# Patient Record
Sex: MALE | Race: WHITE | NOT HISPANIC OR LATINO | Employment: FULL TIME | URBAN - METROPOLITAN AREA
[De-identification: names, ages, dates, MRNs, and addresses within clinical notes are randomized per-mention and may not be internally consistent; named-entity substitution may affect disease eponyms.]

---

## 2017-10-04 ENCOUNTER — OFFICE VISIT - HEALTHEAST (OUTPATIENT)
Dept: FAMILY MEDICINE | Facility: CLINIC | Age: 42
End: 2017-10-04

## 2017-10-04 DIAGNOSIS — E78.5 HYPERLIPIDEMIA: ICD-10-CM

## 2017-10-04 DIAGNOSIS — S63.642A: ICD-10-CM

## 2017-10-04 LAB
ALT SERPL W P-5'-P-CCNC: 19 U/L (ref 0–45)
AST SERPL W P-5'-P-CCNC: 17 U/L (ref 0–40)
CHOLEST SERPL-MCNC: 182 MG/DL
FASTING STATUS PATIENT QL REPORTED: YES
HDLC SERPL-MCNC: 41 MG/DL
LDLC SERPL CALC-MCNC: 111 MG/DL
TRIGL SERPL-MCNC: 151 MG/DL

## 2017-10-04 ASSESSMENT — MIFFLIN-ST. JEOR: SCORE: 1773.14

## 2017-12-05 ENCOUNTER — COMMUNICATION - HEALTHEAST (OUTPATIENT)
Dept: FAMILY MEDICINE | Facility: CLINIC | Age: 42
End: 2017-12-05

## 2017-12-05 DIAGNOSIS — E78.5 HYPERLIPIDEMIA: ICD-10-CM

## 2018-05-23 ENCOUNTER — COMMUNICATION - HEALTHEAST (OUTPATIENT)
Dept: FAMILY MEDICINE | Facility: CLINIC | Age: 43
End: 2018-05-23

## 2018-05-23 DIAGNOSIS — E78.5 HYPERLIPIDEMIA: ICD-10-CM

## 2018-11-04 ENCOUNTER — COMMUNICATION - HEALTHEAST (OUTPATIENT)
Dept: FAMILY MEDICINE | Facility: CLINIC | Age: 43
End: 2018-11-04

## 2018-11-04 DIAGNOSIS — E78.5 HYPERLIPIDEMIA: ICD-10-CM

## 2019-01-22 ENCOUNTER — COMMUNICATION - HEALTHEAST (OUTPATIENT)
Dept: FAMILY MEDICINE | Facility: CLINIC | Age: 44
End: 2019-01-22

## 2019-01-22 DIAGNOSIS — E78.5 HYPERLIPIDEMIA: ICD-10-CM

## 2020-03-02 ENCOUNTER — COMMUNICATION - HEALTHEAST (OUTPATIENT)
Dept: FAMILY MEDICINE | Facility: CLINIC | Age: 45
End: 2020-03-02

## 2020-03-02 DIAGNOSIS — E78.5 HYPERLIPIDEMIA: ICD-10-CM

## 2020-05-26 ENCOUNTER — COMMUNICATION - HEALTHEAST (OUTPATIENT)
Dept: FAMILY MEDICINE | Facility: CLINIC | Age: 45
End: 2020-05-26

## 2020-05-26 DIAGNOSIS — E78.5 HYPERLIPIDEMIA: ICD-10-CM

## 2020-06-30 ENCOUNTER — OFFICE VISIT - HEALTHEAST (OUTPATIENT)
Dept: FAMILY MEDICINE | Facility: CLINIC | Age: 45
End: 2020-06-30

## 2020-06-30 DIAGNOSIS — E78.5 HYPERLIPIDEMIA: ICD-10-CM

## 2020-06-30 DIAGNOSIS — D68.59 PRIMARY HYPERCOAGULABLE STATE (H): ICD-10-CM

## 2020-06-30 DIAGNOSIS — E78.5 HYPERLIPIDEMIA, UNSPECIFIED HYPERLIPIDEMIA TYPE: ICD-10-CM

## 2020-06-30 ASSESSMENT — PATIENT HEALTH QUESTIONNAIRE - PHQ9: SUM OF ALL RESPONSES TO PHQ QUESTIONS 1-9: 0

## 2020-07-24 ENCOUNTER — RECORDS - HEALTHEAST (OUTPATIENT)
Dept: ADMINISTRATIVE | Facility: OTHER | Age: 45
End: 2020-07-24

## 2020-07-31 ENCOUNTER — RECORDS - HEALTHEAST (OUTPATIENT)
Dept: ADMINISTRATIVE | Facility: OTHER | Age: 45
End: 2020-07-31

## 2020-08-19 ENCOUNTER — RECORDS - HEALTHEAST (OUTPATIENT)
Dept: ADMINISTRATIVE | Facility: OTHER | Age: 45
End: 2020-08-19

## 2020-08-19 ENCOUNTER — COMMUNICATION - HEALTHEAST (OUTPATIENT)
Dept: FAMILY MEDICINE | Facility: CLINIC | Age: 45
End: 2020-08-19

## 2020-08-20 ENCOUNTER — OFFICE VISIT - HEALTHEAST (OUTPATIENT)
Dept: FAMILY MEDICINE | Facility: CLINIC | Age: 45
End: 2020-08-20

## 2020-08-20 ENCOUNTER — COMMUNICATION - HEALTHEAST (OUTPATIENT)
Dept: FAMILY MEDICINE | Facility: CLINIC | Age: 45
End: 2020-08-20

## 2020-08-20 DIAGNOSIS — Z01.818 PREOP GENERAL PHYSICAL EXAM: ICD-10-CM

## 2020-08-20 DIAGNOSIS — E78.5 HYPERLIPIDEMIA, UNSPECIFIED HYPERLIPIDEMIA TYPE: ICD-10-CM

## 2020-08-20 DIAGNOSIS — S83.512S RUPTURE OF ANTERIOR CRUCIATE LIGAMENT OF LEFT KNEE, SEQUELA: ICD-10-CM

## 2020-08-20 DIAGNOSIS — D68.59 PRIMARY HYPERCOAGULABLE STATE (H): ICD-10-CM

## 2020-09-01 ENCOUNTER — AMBULATORY - HEALTHEAST (OUTPATIENT)
Dept: LAB | Facility: CLINIC | Age: 45
End: 2020-09-01

## 2020-09-01 ENCOUNTER — AMBULATORY - HEALTHEAST (OUTPATIENT)
Dept: NURSING | Facility: CLINIC | Age: 45
End: 2020-09-01

## 2020-09-01 DIAGNOSIS — Z00.00 ROUTINE ADULT HEALTH MAINTENANCE: ICD-10-CM

## 2020-09-01 DIAGNOSIS — E78.5 HYPERLIPIDEMIA: ICD-10-CM

## 2020-09-01 DIAGNOSIS — Z01.818 PREOP GENERAL PHYSICAL EXAM: ICD-10-CM

## 2020-09-01 LAB
ALT SERPL W P-5'-P-CCNC: 25 U/L (ref 0–45)
AST SERPL W P-5'-P-CCNC: 20 U/L (ref 0–40)
CHOLEST SERPL-MCNC: 198 MG/DL
CK SERPL-CCNC: 237 U/L (ref 30–190)
ERYTHROCYTE [DISTWIDTH] IN BLOOD BY AUTOMATED COUNT: 11.8 % (ref 11–14.5)
FASTING STATUS PATIENT QL REPORTED: YES
HCT VFR BLD AUTO: 48.6 % (ref 40–54)
HDLC SERPL-MCNC: 41 MG/DL
HGB BLD-MCNC: 16.1 G/DL (ref 14–18)
INR PPP: 0.92 (ref 0.9–1.1)
LDLC SERPL CALC-MCNC: 127 MG/DL
MCH RBC QN AUTO: 28.9 PG (ref 27–34)
MCHC RBC AUTO-ENTMCNC: 33.1 G/DL (ref 32–36)
MCV RBC AUTO: 87 FL (ref 80–100)
PLATELET # BLD AUTO: 208 THOU/UL (ref 140–440)
PMV BLD AUTO: 9 FL (ref 7–10)
RBC # BLD AUTO: 5.56 MILL/UL (ref 4.4–6.2)
TRIGL SERPL-MCNC: 152 MG/DL
WBC: 4.6 THOU/UL (ref 4–11)

## 2020-09-08 ENCOUNTER — COMMUNICATION - HEALTHEAST (OUTPATIENT)
Dept: FAMILY MEDICINE | Facility: CLINIC | Age: 45
End: 2020-09-08

## 2020-09-11 ENCOUNTER — RECORDS - HEALTHEAST (OUTPATIENT)
Dept: ADMINISTRATIVE | Facility: OTHER | Age: 45
End: 2020-09-11

## 2020-09-25 ENCOUNTER — RECORDS - HEALTHEAST (OUTPATIENT)
Dept: ADMINISTRATIVE | Facility: OTHER | Age: 45
End: 2020-09-25

## 2020-10-23 ENCOUNTER — RECORDS - HEALTHEAST (OUTPATIENT)
Dept: ADMINISTRATIVE | Facility: OTHER | Age: 45
End: 2020-10-23

## 2020-11-23 ENCOUNTER — COMMUNICATION - HEALTHEAST (OUTPATIENT)
Dept: FAMILY MEDICINE | Facility: CLINIC | Age: 45
End: 2020-11-23

## 2020-11-23 DIAGNOSIS — E78.5 HYPERLIPIDEMIA, UNSPECIFIED HYPERLIPIDEMIA TYPE: ICD-10-CM

## 2020-12-04 ENCOUNTER — RECORDS - HEALTHEAST (OUTPATIENT)
Dept: ADMINISTRATIVE | Facility: OTHER | Age: 45
End: 2020-12-04

## 2021-02-09 ENCOUNTER — AMBULATORY - HEALTHEAST (OUTPATIENT)
Dept: LAB | Facility: CLINIC | Age: 46
End: 2021-02-09

## 2021-02-09 DIAGNOSIS — E78.5 HYPERLIPIDEMIA, UNSPECIFIED HYPERLIPIDEMIA TYPE: ICD-10-CM

## 2021-02-09 LAB
ALT SERPL W P-5'-P-CCNC: 14 U/L (ref 0–45)
AST SERPL W P-5'-P-CCNC: 17 U/L (ref 0–40)
CHOLEST SERPL-MCNC: 296 MG/DL
CK SERPL-CCNC: 226 U/L (ref 30–190)
FASTING STATUS PATIENT QL REPORTED: YES
HDLC SERPL-MCNC: 42 MG/DL
LDLC SERPL CALC-MCNC: 220 MG/DL
TRIGL SERPL-MCNC: 171 MG/DL

## 2021-02-10 ENCOUNTER — COMMUNICATION - HEALTHEAST (OUTPATIENT)
Dept: FAMILY MEDICINE | Facility: CLINIC | Age: 46
End: 2021-02-10

## 2021-02-10 ENCOUNTER — AMBULATORY - HEALTHEAST (OUTPATIENT)
Dept: FAMILY MEDICINE | Facility: CLINIC | Age: 46
End: 2021-02-10

## 2021-02-10 DIAGNOSIS — E78.5 HYPERLIPIDEMIA, UNSPECIFIED HYPERLIPIDEMIA TYPE: ICD-10-CM

## 2021-02-10 RX ORDER — ROSUVASTATIN CALCIUM 10 MG/1
10 TABLET, COATED ORAL AT BEDTIME
Status: SHIPPED
Start: 2021-02-10 | End: 2021-10-14

## 2021-03-15 ENCOUNTER — RECORDS - HEALTHEAST (OUTPATIENT)
Dept: ADMINISTRATIVE | Facility: OTHER | Age: 46
End: 2021-03-15

## 2021-05-02 ENCOUNTER — RECORDS - HEALTHEAST (OUTPATIENT)
Dept: ADMINISTRATIVE | Facility: OTHER | Age: 46
End: 2021-05-02

## 2021-05-27 ASSESSMENT — PATIENT HEALTH QUESTIONNAIRE - PHQ9: SUM OF ALL RESPONSES TO PHQ QUESTIONS 1-9: 0

## 2021-05-31 VITALS — WEIGHT: 194 LBS | HEIGHT: 71 IN | BODY MASS INDEX: 27.16 KG/M2

## 2021-06-01 ENCOUNTER — RECORDS - HEALTHEAST (OUTPATIENT)
Dept: ADMINISTRATIVE | Facility: CLINIC | Age: 46
End: 2021-06-01

## 2021-06-04 VITALS
TEMPERATURE: 98.8 F | WEIGHT: 194 LBS | DIASTOLIC BLOOD PRESSURE: 72 MMHG | BODY MASS INDEX: 27.25 KG/M2 | RESPIRATION RATE: 12 BRPM | SYSTOLIC BLOOD PRESSURE: 104 MMHG | HEART RATE: 78 BPM

## 2021-06-06 NOTE — TELEPHONE ENCOUNTER
RN cannot approve Refill Request    RN can NOT refill this medication Protocol failed and NO refill given.      Delma Hahn, [facility] Triage/Med Refill 3/3/2020    Requested Prescriptions   Pending Prescriptions Disp Refills     rosuvastatin (CRESTOR) 10 MG tablet [Pharmacy Med Name: ROSUVASTATIN  10MG  TAB] 90 tablet 3     Sig: TAKE 1 TABLET BY MOUTH  DAILY       Statins Refill Protocol (Hmg CoA Reductase Inhibitors) Failed - 3/2/2020  8:10 PM        Failed - PCP or prescribing provider visit in past 12 months      Last office visit with prescriber/PCP: Visit date not found OR same dept: Visit date not found OR same specialty: 10/4/2017 Kelvin Ray MD  Last physical: Visit date not found Last MTM visit: Visit date not found   Next visit within 3 mo: Visit date not found  Next physical within 3 mo: Visit date not found  Prescriber OR PCP: Luisana Brown MD  Last diagnosis associated with med order: 1. Hyperlipidemia  - rosuvastatin (CRESTOR) 10 MG tablet [Pharmacy Med Name: ROSUVASTATIN  10MG  TAB]; TAKE 1 TABLET BY MOUTH  DAILY  Dispense: 90 tablet; Refill: 3    If protocol passes may refill for 12 months if within 3 months of last provider visit (or a total of 15 months).

## 2021-06-06 NOTE — TELEPHONE ENCOUNTER
Please contact this patient let him know that the medication was refilled for 90 days but needs to be seen for an office visit with Dr. Ray

## 2021-06-07 ENCOUNTER — AMBULATORY - HEALTHEAST (OUTPATIENT)
Dept: LAB | Facility: CLINIC | Age: 46
End: 2021-06-07

## 2021-06-07 DIAGNOSIS — E78.5 HYPERLIPIDEMIA, UNSPECIFIED HYPERLIPIDEMIA TYPE: ICD-10-CM

## 2021-06-07 LAB
ALT SERPL W P-5'-P-CCNC: 20 U/L (ref 0–45)
AST SERPL W P-5'-P-CCNC: 21 U/L (ref 0–40)
CHOLEST SERPL-MCNC: 209 MG/DL
CK SERPL-CCNC: 348 U/L (ref 30–190)
FASTING STATUS PATIENT QL REPORTED: YES
HDLC SERPL-MCNC: 40 MG/DL
LDLC SERPL CALC-MCNC: 133 MG/DL
TRIGL SERPL-MCNC: 182 MG/DL

## 2021-06-09 NOTE — PROGRESS NOTES
"Eliud Groves is a 45 y.o. male who is being evaluated via a billable video visit.      The patient has been notified of following:     \"This video visit will be conducted via a call between you and your physician/provider. We have found that certain health care needs can be provided without the need for an in-person physical exam.  This service lets us provide the care you need with a video conversation.  If a prescription is necessary we can send it directly to your pharmacy.  If lab work is needed we can place an order for that and you can then stop by our lab to have the test done at a later time.    Video visits are billed at different rates depending on your insurance coverage. Please reach out to your insurance provider with any questions.    If during the course of the call the physician/provider feels a video visit is not appropriate, you will not be charged for this service.\"    Patient has given verbal consent to a Video visit? Yes  How would you like to obtain your AVS? AVS Preference: Mail a copy.  Patient would like the video invitation sent by: Text to cell phone: [phone]  Will anyone else be joining your video visit? No        Video Start Time: 8:47 AM    Additional provider notes:     Subjective: This patient had a virtual visit, video, because of the coronavirus pandemic.    This patient needed follow-up on his hyper lipid treatment.  He is on rosuvastatin.    He occasionally gets some achiness in through the muscles.  I will recheck CPK AST ALT and lipid.  He has had some elevated CPK in the past without her medications.    He has been pretty stable with this medicine    Back in October 2017  normal AST ALT and CPK    He works from home now because of the COVID 19.  Is a  does design work.    No COVID-19 symptoms or exposures that he knows of.    He did get a crack on his toe I think was the fourth toe on the right foot at the base of the toe on the plantar aspect, " this opened up he has a crack there is not infected does not look like there is any drainage he will use Neosporin for them.    He will come in for labs CPK AST ALT and lipid as a lab only check blood pressure as well    Patient's has had no leg swelling.  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 Caro from hematology in 2012.  No long-term anticoagulation indicated, consider subcu Lovenox for some prolonged incapacity etc.    He has had no problems.    Stays active [employer] running.    Tobacco status: He  reports that he has never smoked. He does not have any smokeless tobacco history on file.    Patient Active Problem List    Diagnosis Date Noted     Factor V Leiden Mutation      Influenza      Cough      Hyperlipidemia        Current Outpatient Medications   Medication Sig Dispense Refill     rosuvastatin (CRESTOR) 10 MG tablet Take 1 tablet (10 mg total) by mouth daily. 90 tablet 3     No current facility-administered medications for this visit.        ROS:   10 point review of systems positive as outlined above otherwise negative    Objective:    There were no vitals taken for this visit.  There is no height or weight on file to calculate BMI.      General appearance: No acute distress    HEENT denies any neck stiffness or swollen glands denies any sore throat    Lungs: Nonlabored breathing no wheezing    Heart: No racing or palpitations    Joints without redness or swelling    Extremities without edema.  No redness or warmth to the calf    Patient has a crack at the base of the right fourth toe on the plantar aspect, no secondary infection    No abdominal pain or tenderness.    No weakness or numbness.    Results for orders placed or performed in visit on 10/04/17   Lipid [place] FASTING   Result Value Ref Range    Cholesterol 182 <=199 mg/dL    Triglycerides 151 (H) <=149 mg/dL    HDL Cholesterol 41 >=40 mg/dL    LDL Calculated 111 <=129 mg/dL    Patient Fasting >  8hrs? Yes    AST (SGOT)   Result Value Ref Range    AST 17 0 - 40 U/L   ALT (SGPT)   Result Value Ref Range    ALT 19 0 - 45 U/L   CK Total   Result Value Ref Range    CK, Total 165 30 - 190 U/L       Assessment:  1. Hyperlipidemia, unspecified hyperlipidemia type     2. Hyperlipidemia  rosuvastatin (CRESTOR) 10 MG tablet    Lipid [place] FASTING    AST (SGOT)    ALT (SGPT)    CK Total   3. Factor V Leiden Mutation     4.  Fissure at the base of the fourth toe, use Neosporin      Hyperlipidemia on rosuvastatin 10 mg, lab only CPK AST ALT and lipid.    Plan physical in 6 to 12 months at the office.    Please see above discussion regarding factor V Leyden mutation.    Plan: As outlined above    This transcription uses voice recognition software, which may contain typographical errors.  There is a Dr. Ray has gone pain not too bad limb get      Video-Visit Details    Type of service:  Video Visit    Video End Time (time video stopped): 9:04 AM  Originating Location (pt. Location): Home    Distant Location (provider location):  [facility]     Platform used for Video Visit: Prince Ray MD

## 2021-06-10 NOTE — TELEPHONE ENCOUNTER
New Appointment Needed  What is the reason for the visit:    Pre-Op Appt Request  When is the surgery? :  09.11.20  Where is the surgery?:   [facility] in [place]   Who is the surgeon? :  Dr Díaz   What type of surgery is being done?: ACL replacement   Provider Preference: PCP or willing to see anybody   How soon do you need to be seen?: Friday 08.21.20 if able to since he has a lab apt scheduled that day   Waitlist offered?: No  Okay to leave a detailed message:  Yes

## 2021-06-10 NOTE — PROGRESS NOTES
"[address] [address]  Dept: [phone]  Dept Fax: [phone]  Primary Provider: Kelvin Ray MD      Eliud Groves is a 45 y.o. male who is being evaluated via a billable video visit.      The patient has been notified of following:     \"This video visit will be conducted via a call between you and your physician/provider. We have found that certain health care needs can be provided without the need for an in-person physical exam.  This service lets us provide the care you need with a video conversation.  If a prescription is necessary we can send it directly to your pharmacy.  If lab work is needed we can place an order for that and you can then stop by our lab to have the test done at a later time.    Video visits are billed at different rates depending on your insurance coverage. Please reach out to your insurance provider with any questions.    If during the course of the call the physician/provider feels a video visit is not appropriate, you will not be charged for this service.\"    Patient has given verbal consent to a Video visit? Yes    Patient would like to receive their AVS by AVS Preference: Mail a copy.    Patient would like the video invitation sent by: Text to cell phone: [phone]    Will anyone else be joining your video visit? No        Video Start Time: 11:25 am    PREOPERATIVE EVALUATION:  Today's date: 8/20/2020    Eliud Groves is a 45 y.o. male who presents for a preoperative evaluation.    Proposed Surgery/ Procedure: ACL replacement left knee  Date of Surgery/ Procedure: 9/11/2020  Time of Surgery/ Procedure: 12:00PM  Hospital/Surgical Facility: [facility] Fax Number: [phone]  Primary Physician: Kelvin Ray MD  Type of Anesthesia Anticipated: General    HPI related to upcoming procedure: Patient had a waterskiing accident this summer where he was getting up to slalom and felt like something hit him in " the back of the leg.    He was evaluated in felt that he had a hamstring strain.    They went on for an MRI and it showed a ACL complete tear.    Patient had previous ACL repair 15 to 18 years ago with medial meniscal repair as well.  This was following a snowboard injury    He had done well up until this past summer    He was evaluated weighted by the orthopedist and the MRI was reviewed showing the hamstring strain and a recurrent ACL rupture.    Plan is for the ACL repair    This patient has family history for factor V Leyden mutation, his sister has had a DVT and is homozygous for this.    The patient got tested and is heterozygous for this.    He saw hematology, Dr. Caro, back in 2014.  Recommendation was that if he is immobilized or has some type of surgery or casting he should have some DVT prophylaxis.    I reviewed options for the patient and decided to take Xarelto 10 mg 1 a day postop for 30 days.    Prescription was sent to the pharmacy and he will bring that to his surgery and discussed that with the orthopedic surgeon as to when he will start.    Patient will get additional labs of CBC INR lipid AST and ALT and get vital signs checked on 8/25/2020 at the [facility].    There is no family history for anesthesia problems or bleeding problems    Have you ever had a heart attack or stroke? No  Have you ever had surgery on your heart or blood vessels, such as a stent, coronary (heart) bypass, or surgery on an artery in the head, neck, heart, or legs? No  Do you have chest pain when you are physically active? No  Do you have a history of heart failure? No  Do you currently have a cold, bronchitis, or symptoms of other respiratory (head and chest) infections? No  Do you have a cough, shortness of breath, or wheezing? No  Do you or anyone in your family have a history of blood clots? Yes; sister have a blood clot at one point. Patient has heterozygous Factor V Leiden mutation  Do you or anyone in  your family have a serious bleeding problem, such as long-lasting bleeding after surgeries or cuts? No  Have you ever had anemia or been told to take iron pills? No  Have you had any abnormal blood loss such as black, tarry or bloody stools, or abnormal vaginal bleeding?No  Have you ever had a blood transfusion? No   Are you willing to have a blood transfusion if it is medically needed before, during, or after your surgery? Yes  Have you or anyone in your family ever had problems with anesthesia (sedation for surgery)? No  Do you have sleep apnea, excessive snoring, or daytime drowsiness? No  Do you have any artifical heart valves or other implanted medical devices, such as a pacemaker, defibrillator, or continuous glucose monitor? No  Do you have any artifical joints? No  Are you allergic to latex? No  Is there any chance that you may be pregnant? No    Patient has a Health Care Directive or Living Will:  Yes      Review of Systems  10 pt ROS positive as above   Otherwise neg  Patient denies any COVID-19 symptoms or exposure.  Denies any cough congestion    Has not had general anesthesia before.    He has no history of any asthma or breathing problems    No chest pain.    Patient is quite active.  He is a runner.      1. Preop general physical exam  HM2(CBC w/o Differential)    INR   2. Hyperlipidemia, unspecified hyperlipidemia type     3. Rupture of anterior cruciate ligament of left knee, sequela  rivaroxaban ANTICOAGULANT (XARELTO) 10 mg tablet   4. Factor V Leiden Mutation  rivaroxaban ANTICOAGULANT (XARELTO) 10 mg tablet    heterozygous   5. DVT prophylaxis  rivaroxaban ANTICOAGULANT (XARELTO) 10 mg tablet       Patient Active Problem List    Diagnosis Date Noted     Factor V Leiden Mutation      Influenza      Cough      Hyperlipidemia      No past medical history on file.  No past surgical history on file.  Current Outpatient Medications   Medication Sig Dispense Refill     rosuvastatin (CRESTOR) 10 MG  tablet Take 1 tablet (10 mg total) by mouth daily. 90 tablet 3     rivaroxaban ANTICOAGULANT (XARELTO) 10 mg tablet Take 1 tablet (10 mg total) by mouth daily. 30 tablet 0     No current facility-administered medications for this visit.        Allergies   Allergen Reactions     Atorvastatin Myalgia     Simvastatin      Elevated CPK       Social History     Tobacco Use     Smoking status: Never Smoker   Substance Use Topics     Alcohol use: Not on file      FH:    Heart disease: dad age 40's CAD and hyperlipidemia    Sister with homozygous factor V Leiden mutation      Social History     Substance and Sexual Activity   Drug Use Not on file           Objective      Vitals:  No vitals were obtained today due to virtual visit.    He will come in on 8/25/2020 and get vital signs    Physical Exam      General appearance no acute distress    HEENT: No nasal congestion no scleral icterus oropharynx denies any sore throat no adenopathy    Neck is supple    Lungs: Nonlabored breathing no wheezing.    Heart: No palpitations or rapid heartbeat.    Abdomen denies abdominal pain, no fullness or mass.    Denies any adenopathy.    Skin was normal no rashes    Extremities.  Presently no swelling in through the left knee.    Denies any joint redness.    Results of MRI reviewed with the patient      Please refer to the physical examination documented in the anesthesia record on the day of surgery.    Patient will be coming in for lab only on 8/25/2020 with lipid AST ALT CBC and INR    PRE-OP Diagnostics:   No chest x-ray or EKG indicated    [id number]}     Assessment & Plan     Patient is okay for surgery, will use Xarelto for 1 month postop      The proposed surgical procedure is considered   INTERMEDIATE    REVISED CARDIAC RISK INDEX   The patient has the following serious cardiovascular risks for perioperative complications:  No serious cardiac risks = 0 points    INTERPRETATION: 0 points: Class I (very low risk - 0.4% complication  rate)    The patient has the following additional risks and recommendations for perioperative complications:      ANEMIA/BLEEDING/CLOTTING:    - History of factor V Leiden mutation, heterozygous  Patient will have DVT prevention postoperatively with Xarelto 10 mg daily     MEDICATION INSTRUCTIONS:  Patient has hyperlipidemia and will continue rosuvastatin pre-and postop    RECOMMENDATION:  APPROVAL GIVEN to proceed with proposed procedure, without further diagnostic evaluation.      Video-Visit Details    Type of service:  Video Visit    Video End Time (time video stopped): 11:52 AM  Originating Location (pt. Location): Home    Distant Location (provider location):  [facility]     Platform used for Video Visit: [employer]    No follow-ups on file.    Signed Electronically by: Kelvin Ray MD    Copy of this evaluation report is provided to requesting physician.    Preop [facility] Preop Guidelines    Revised Cardiac Risk Index

## 2021-06-10 NOTE — TELEPHONE ENCOUNTER
I would recommend he try to do a virtual video visit for preop with me tomorrow.    He will need additional lab tests in addition to just the cholesterol level.    We would then also decide if he needs anything else like a chest x-ray or EKG.    He could then get these done on Friday, in addition to his cholesterol blood test

## 2021-06-11 NOTE — TELEPHONE ENCOUNTER
Labs faxed to the number below    ----- Message from Kelvin Ray MD sent at 9/4/2020 11:10 AM CDT -----  Please fax these labs to [facility] .    I did contact the patient regarding the results and he will follow-up in 3 months, monitor for any myalgias symptoms he will stay on the rosuvastatin low-dose

## 2021-06-13 NOTE — TELEPHONE ENCOUNTER
I put in future orders for liver function and CPK and lipid    Please get this checked in the next month or so.    Continue to stay off the rosuvastatin

## 2021-06-15 NOTE — TELEPHONE ENCOUNTER
----- Message from Kelvin Ray MD sent at 2/10/2021 11:52 AM CST -----  Please contact this patient and schedule a follow-up lab appointment in about 3 months.  I put in orders  I did discuss with him his lipid panel and a CPK level    He is going to restart the Crestor at 10 mg daily.  His CPK level was essentially the same whether he was on it or not.  He is more active working out since he has had his knee surgery.    He will call me if he develops any myalgias symptoms when he is back on the Crestor

## 2021-06-15 NOTE — TELEPHONE ENCOUNTER
Called and spoke with Eliud Groves , Message was given, Eliud Groves  understood, no further questions.  Appointment was offer, Patient declined. Patient will call back.

## 2021-06-23 NOTE — TELEPHONE ENCOUNTER
RN cannot approve Refill Request    RN can NOT refill this medication PCP messaged that patient is overdue for Office Visit.     Delma Hahn, [facility] Triage/Med Refill 1/24/2019    Requested Prescriptions   Pending Prescriptions Disp Refills     rosuvastatin (CRESTOR) 10 MG tablet [Pharmacy Med Name: ROSUVASTATIN  10MG  TAB] 90 tablet 0     Sig: TAKE 1 TABLET BY MOUTH  DAILY    Statins Refill Protocol (Hmg CoA Reductase Inhibitors) Failed - 1/22/2019  8:06 PM       Failed - PCP or prescribing provider visit in past 12 months     Last office visit with prescriber/PCP: 10/4/2017 Kelvin Ray MD OR same dept: Visit date not found OR same specialty: 10/4/2017 Kelvin Ray MD  Last physical: Visit date not found Last MTM visit: Visit date not found   Next visit within 3 mo: Visit date not found  Next physical within 3 mo: Visit date not found  Prescriber OR PCP: eKlvin Ray MD  Last diagnosis associated with med order: 1. Hyperlipidemia  - rosuvastatin (CRESTOR) 10 MG tablet [Pharmacy Med Name: ROSUVASTATIN  10MG  TAB]; TAKE 1 TABLET BY MOUTH  DAILY  Dispense: 90 tablet; Refill: 0    If protocol passes may refill for 12 months if within 3 months of last provider visit (or a total of 15 months).

## 2021-07-23 ENCOUNTER — OFFICE VISIT (OUTPATIENT)
Dept: FAMILY MEDICINE | Facility: CLINIC | Age: 46
End: 2021-07-23
Payer: COMMERCIAL

## 2021-07-23 VITALS
HEART RATE: 65 BPM | TEMPERATURE: 97.7 F | HEIGHT: 71 IN | SYSTOLIC BLOOD PRESSURE: 134 MMHG | DIASTOLIC BLOOD PRESSURE: 86 MMHG | WEIGHT: 193.25 LBS | BODY MASS INDEX: 27.06 KG/M2

## 2021-07-23 DIAGNOSIS — L98.8 MACERATION OF SKIN: Primary | ICD-10-CM

## 2021-07-23 PROCEDURE — 99213 OFFICE O/P EST LOW 20 MIN: CPT | Performed by: PHYSICIAN ASSISTANT

## 2021-07-23 ASSESSMENT — MIFFLIN-ST. JEOR: SCORE: 1768.45

## 2021-07-23 NOTE — PROGRESS NOTES
"  Subjective:      Eliud Groves is a 46 year old male with chief complaint of lesion between his toes.  He has a spot between the right 4th and 5th toes that has been bothering him for about a week and a half.  He thinks that the start the pinky toe did swell up a little bit, that has improved. He had a small white spot in between the toes and it seems to be getting larger. Not really painful. He does not recall an insect bite. He thought perhaps he could have had a blister in that area.    Patient Active Problem List   Diagnosis     Factor V Leiden Mutation     Hyperlipidemia       Current Outpatient Medications:      rosuvastatin (CRESTOR) 10 MG tablet, [ROSUVASTATIN (CRESTOR) 10 MG TABLET] Take 1 tablet (10 mg total) by mouth at bedtime. (Patient taking differently: Take 10 mg by mouth At Bedtime Taking a half tab), Disp: , Rfl:      Objective:     Allergies:  Atorvastatin and Simvastatin    Vitals:  Vitals:    07/23/21 0743 07/23/21 0832   BP: (!) 147/87 134/86   Pulse: 65 65   Temp: 97.7  F (36.5  C)    Weight: 87.7 kg (193 lb 4 oz)    Height: 1.795 m (5' 10.67\")        Vital signs reviewed.  General: Patient is alert and oriented x 3, in no apparent distress  Skin: Moderate maceration of skin in between the 4th and 5th toes on the right foot, entire area is about 8 to 9 mm, no surrounding erythema, no purulent fluid or serous fluid leaking    I did use a scissors and debride the wound, cutting off the external macerated tissue        Assessment and Plan:   1. Maceration of skin  Unclear cause of this. Could be a blister, insect bite, abrasion.  No signs of infection today. I discussed with him that he needs to keep his foot dry, and specifically the area between his toes dry. Discussed ways to do that including using a cotton ball or piece of gauze in between his toes.   I anticipate skin should dry and heal within the next 4 to 5 days.  He is going up to his cabin this weekend and is wondering if he can " swim. I discussed with him that if he is swimming in a lake, he would have a risk of exposing the area to bacteria, and certainly would delay healing time.  We discussed reasons to follow-up with us including no significant improvement by next week, or redness in the surrounding area or toes.      This dictation uses voice recognition software, which may contain typographical errors.

## 2021-08-21 ENCOUNTER — HEALTH MAINTENANCE LETTER (OUTPATIENT)
Age: 46
End: 2021-08-21

## 2021-10-11 ENCOUNTER — HEALTH MAINTENANCE LETTER (OUTPATIENT)
Age: 46
End: 2021-10-11

## 2021-10-13 ENCOUNTER — LAB (OUTPATIENT)
Dept: LAB | Facility: CLINIC | Age: 46
End: 2021-10-13
Payer: COMMERCIAL

## 2021-10-13 DIAGNOSIS — E78.5 HYPERLIPIDEMIA, UNSPECIFIED HYPERLIPIDEMIA TYPE: Primary | ICD-10-CM

## 2021-10-13 DIAGNOSIS — E78.5 HYPERLIPIDEMIA, UNSPECIFIED HYPERLIPIDEMIA TYPE: ICD-10-CM

## 2021-10-13 LAB
ALBUMIN SERPL-MCNC: 4 G/DL (ref 3.5–5)
ALP SERPL-CCNC: 73 U/L (ref 45–120)
ALT SERPL W P-5'-P-CCNC: 22 U/L (ref 0–45)
ANION GAP SERPL CALCULATED.3IONS-SCNC: 11 MMOL/L (ref 5–18)
AST SERPL W P-5'-P-CCNC: 21 U/L (ref 0–40)
BILIRUB SERPL-MCNC: 0.5 MG/DL (ref 0–1)
BUN SERPL-MCNC: 15 MG/DL (ref 8–22)
CALCIUM SERPL-MCNC: 9.7 MG/DL (ref 8.5–10.5)
CHLORIDE BLD-SCNC: 106 MMOL/L (ref 98–107)
CHOLEST SERPL-MCNC: 189 MG/DL
CK SERPL-CCNC: 299 U/L (ref 30–190)
CO2 SERPL-SCNC: 24 MMOL/L (ref 22–31)
CREAT SERPL-MCNC: 1.2 MG/DL (ref 0.7–1.3)
FASTING STATUS PATIENT QL REPORTED: YES
GFR SERPL CREATININE-BSD FRML MDRD: 72 ML/MIN/1.73M2
GLUCOSE BLD-MCNC: 113 MG/DL (ref 70–125)
HDLC SERPL-MCNC: 45 MG/DL
LDLC SERPL CALC-MCNC: 120 MG/DL
POTASSIUM BLD-SCNC: 4.4 MMOL/L (ref 3.5–5)
PROT SERPL-MCNC: 6.6 G/DL (ref 6–8)
SODIUM SERPL-SCNC: 141 MMOL/L (ref 136–145)
TRIGL SERPL-MCNC: 119 MG/DL

## 2021-10-13 PROCEDURE — 80061 LIPID PANEL: CPT

## 2021-10-13 PROCEDURE — 80053 COMPREHEN METABOLIC PANEL: CPT | Performed by: PHYSICIAN ASSISTANT

## 2021-10-13 PROCEDURE — 82550 ASSAY OF CK (CPK): CPT

## 2021-10-13 PROCEDURE — 36415 COLL VENOUS BLD VENIPUNCTURE: CPT

## 2021-12-07 DIAGNOSIS — E78.5 HYPERLIPIDEMIA, UNSPECIFIED HYPERLIPIDEMIA TYPE: ICD-10-CM

## 2021-12-07 RX ORDER — ROSUVASTATIN CALCIUM 5 MG/1
5 TABLET, COATED ORAL AT BEDTIME
Qty: 90 TABLET | Refills: 0 | Status: SHIPPED | OUTPATIENT
Start: 2021-12-07 | End: 2022-02-14

## 2021-12-07 NOTE — TELEPHONE ENCOUNTER
Reason for Call:  Medication or medication refill:    Do you use a [facility]?  Name of the pharmacy and phone number for the current request:  [facility] [address]    Name of the medication requested: rosuvastatin    Other request:     Can we leave a detailed message on this number? Not Applicable    Phone number patient can be reached at: Home number on file [phone] (home)    Best Time: asap    Call taken on 12/7/2021 at 10:51 AM by Mesha Laura

## 2022-02-14 ENCOUNTER — MYC REFILL (OUTPATIENT)
Dept: FAMILY MEDICINE | Facility: CLINIC | Age: 47
End: 2022-02-14
Payer: COMMERCIAL

## 2022-02-14 DIAGNOSIS — E78.5 HYPERLIPIDEMIA, UNSPECIFIED HYPERLIPIDEMIA TYPE: ICD-10-CM

## 2022-02-16 RX ORDER — ROSUVASTATIN CALCIUM 5 MG/1
5 TABLET, COATED ORAL AT BEDTIME
Qty: 90 TABLET | Refills: 0 | Status: SHIPPED | OUTPATIENT
Start: 2022-02-16 | End: 2022-05-16

## 2022-05-15 DIAGNOSIS — E78.5 HYPERLIPIDEMIA, UNSPECIFIED HYPERLIPIDEMIA TYPE: ICD-10-CM

## 2022-05-16 RX ORDER — ROSUVASTATIN CALCIUM 5 MG/1
TABLET, COATED ORAL
Qty: 90 TABLET | Refills: 0 | Status: SHIPPED | OUTPATIENT
Start: 2022-05-16 | End: 2022-08-15

## 2022-05-16 NOTE — TELEPHONE ENCOUNTER
"Routing refill request to provider for review/approval because:  Labs out of range:  CK    Last Written Prescription Date:  2/16/22  Last Fill Quantity: 90,  # refills: 0   Last office visit provider:  7/23/21     Requested Prescriptions   Pending Prescriptions Disp Refills     rosuvastatin (CRESTOR) 5 MG tablet [Pharmacy Med Name: ROSUVASTATIN CALCIUM 5 MG TAB] 90 tablet 0     Sig: TAKE 1 TABLET BY MOUTH AT BEDTIME       Statins Protocol Failed - 5/16/2022  2:07 PM        Failed - No abnormal creatine kinase in past 12 months     Recent Labs   Lab Test 10/13/21  0817   *                Passed - LDL on file in past 12 months     Recent Labs   Lab Test 10/13/21  0817                Passed - Recent (12 mo) or future (30 days) visit within the authorizing provider's specialty     Patient has had an office visit with the authorizing provider or a provider within the authorizing providers department within the previous 12 mos or has a future within next 30 days. See \"Patient Info\" tab in inbasket, or \"Choose Columns\" in Meds & Orders section of the refill encounter.              Passed - Medication is active on med list        Passed - Patient is age 18 or older             Andrea Fleming RN 05/16/22 2:07 PM  "

## 2022-09-08 DIAGNOSIS — E78.5 HYPERLIPIDEMIA, UNSPECIFIED HYPERLIPIDEMIA TYPE: ICD-10-CM

## 2022-09-09 RX ORDER — ROSUVASTATIN CALCIUM 5 MG/1
TABLET, COATED ORAL
Qty: 30 TABLET | Refills: 0 | Status: SHIPPED | OUTPATIENT
Start: 2022-09-09 | End: 2022-09-14

## 2022-09-09 NOTE — TELEPHONE ENCOUNTER
"Routing refill request to provider for review/approval because:  Labs not current:  Creatinine kinase  Patient needs to be seen because it has been more than 1 year since last office visit.    Former patient of Kelvin Ray & has not established care with another provider.  Please assign refill request to covering provider per Clinic standard process.    Last Written Prescription Date:  8/15/22  Last Fill Quantity: 30,  # refills: 0   Last office visit provider:  7/23/21     Requested Prescriptions   Pending Prescriptions Disp Refills     rosuvastatin (CRESTOR) 5 MG tablet [Pharmacy Med Name: ROSUVASTATIN CALCIUM 5 MG TAB] 30 tablet 0     Sig: TAKE 1 TABLET BY MOUTH EVERYDAY AT BEDTIME       Statins Protocol Failed - 9/8/2022 12:34 PM        Failed - No abnormal creatine kinase in past 12 months     Recent Labs   Lab Test 10/13/21  0817   *                Failed - Recent (12 mo) or future (30 days) visit within the authorizing provider's specialty     Patient has had an office visit with the authorizing provider or a provider within the authorizing providers department within the previous 12 mos or has a future within next 30 days. See \"Patient Info\" tab in inbasket, or \"Choose Columns\" in Meds & Orders section of the refill encounter.              Passed - LDL on file in past 12 months     Recent Labs   Lab Test 10/13/21  0817                Passed - Medication is active on med list        Passed - Patient is age 18 or older             Catherine Leon RN 09/09/22 1:54 PM  "

## 2022-09-13 DIAGNOSIS — E78.5 HYPERLIPIDEMIA, UNSPECIFIED HYPERLIPIDEMIA TYPE: ICD-10-CM

## 2022-09-13 NOTE — TELEPHONE ENCOUNTER
"Routing refill request to provider for review/approval because:  Labs out of range:  CKT done 10/13/21  Patient needs to be seen because it has been more than 1 year since last office visit.   Pt requesting 90 days supply. Pt has umcoming appt sched 10/13/22.    Last Written Prescription Date:  9/9/22  Last Fill Quantity: 30,  # refills: 0   Last office visit provider:  7/23/21     Requested Prescriptions   Pending Prescriptions Disp Refills     rosuvastatin (CRESTOR) 5 MG tablet [Pharmacy Med Name: ROSUVASTATIN CALCIUM 5 MG TAB] 90 tablet 1     Sig: TAKE 1 TABLET BY MOUTH EVERYDAY AT BEDTIME       Statins Protocol Failed - 9/13/2022 11:00 AM        Failed - No abnormal creatine kinase in past 12 months     Recent Labs   Lab Test 10/13/21  0817   *                Failed - Recent (12 mo) or future (30 days) visit within the authorizing provider's specialty     Patient has had an office visit with the authorizing provider or a provider within the authorizing providers department within the previous 12 mos or has a future within next 30 days. See \"Patient Info\" tab in inbasket, or \"Choose Columns\" in Meds & Orders section of the refill encounter.              Passed - LDL on file in past 12 months     Recent Labs   Lab Test 10/13/21  0817                Passed - Medication is active on med list        Passed - Patient is age 18 or older             DEDRICK GRAJEDA RN 09/13/22 11:22 AM  "

## 2022-09-14 RX ORDER — ROSUVASTATIN CALCIUM 5 MG/1
TABLET, COATED ORAL
Qty: 90 TABLET | Refills: 1 | Status: SHIPPED | OUTPATIENT
Start: 2022-09-14 | End: 2023-02-03

## 2022-09-25 ENCOUNTER — HEALTH MAINTENANCE LETTER (OUTPATIENT)
Age: 47
End: 2022-09-25

## 2022-10-13 ENCOUNTER — OFFICE VISIT (OUTPATIENT)
Dept: FAMILY MEDICINE | Facility: CLINIC | Age: 47
End: 2022-10-13
Payer: COMMERCIAL

## 2022-10-13 VITALS
OXYGEN SATURATION: 99 % | DIASTOLIC BLOOD PRESSURE: 88 MMHG | HEART RATE: 68 BPM | WEIGHT: 115 LBS | TEMPERATURE: 97.7 F | RESPIRATION RATE: 20 BRPM | BODY MASS INDEX: 16.1 KG/M2 | SYSTOLIC BLOOD PRESSURE: 128 MMHG | HEIGHT: 71 IN

## 2022-10-13 DIAGNOSIS — Z23 NEEDS FLU SHOT: ICD-10-CM

## 2022-10-13 DIAGNOSIS — E78.5 HYPERLIPIDEMIA, UNSPECIFIED HYPERLIPIDEMIA TYPE: Primary | ICD-10-CM

## 2022-10-13 DIAGNOSIS — Z23 NEED FOR COVID-19 VACCINE: ICD-10-CM

## 2022-10-13 DIAGNOSIS — Z12.11 SCREEN FOR COLON CANCER: ICD-10-CM

## 2022-10-13 DIAGNOSIS — R74.8 ELEVATED CK: ICD-10-CM

## 2022-10-13 LAB
ANION GAP SERPL CALCULATED.3IONS-SCNC: 13 MMOL/L (ref 7–15)
BUN SERPL-MCNC: 14.6 MG/DL (ref 6–20)
CALCIUM SERPL-MCNC: 9.7 MG/DL (ref 8.6–10)
CHLORIDE SERPL-SCNC: 101 MMOL/L (ref 98–107)
CHOLEST SERPL-MCNC: 230 MG/DL
CREAT SERPL-MCNC: 1.05 MG/DL (ref 0.67–1.17)
DEPRECATED HCO3 PLAS-SCNC: 25 MMOL/L (ref 22–29)
GFR SERPL CREATININE-BSD FRML MDRD: 88 ML/MIN/1.73M2
GLUCOSE SERPL-MCNC: 101 MG/DL (ref 70–99)
HDLC SERPL-MCNC: 40 MG/DL
LDLC SERPL CALC-MCNC: 138 MG/DL
NONHDLC SERPL-MCNC: 190 MG/DL
POTASSIUM SERPL-SCNC: 4.4 MMOL/L (ref 3.4–5.3)
SODIUM SERPL-SCNC: 139 MMOL/L (ref 136–145)
TRIGL SERPL-MCNC: 262 MG/DL

## 2022-10-13 PROCEDURE — 0124A COVID-19,PF,PFIZER BOOSTER BIVALENT: CPT | Performed by: FAMILY MEDICINE

## 2022-10-13 PROCEDURE — 36415 COLL VENOUS BLD VENIPUNCTURE: CPT | Performed by: FAMILY MEDICINE

## 2022-10-13 PROCEDURE — 90686 IIV4 VACC NO PRSV 0.5 ML IM: CPT | Performed by: FAMILY MEDICINE

## 2022-10-13 PROCEDURE — 80061 LIPID PANEL: CPT | Performed by: FAMILY MEDICINE

## 2022-10-13 PROCEDURE — 91312 COVID-19,PF,PFIZER BOOSTER BIVALENT: CPT | Performed by: FAMILY MEDICINE

## 2022-10-13 PROCEDURE — 82550 ASSAY OF CK (CPK): CPT | Performed by: FAMILY MEDICINE

## 2022-10-13 PROCEDURE — 80048 BASIC METABOLIC PNL TOTAL CA: CPT | Performed by: FAMILY MEDICINE

## 2022-10-13 PROCEDURE — 99213 OFFICE O/P EST LOW 20 MIN: CPT | Mod: 25 | Performed by: FAMILY MEDICINE

## 2022-10-13 PROCEDURE — 90471 IMMUNIZATION ADMIN: CPT | Performed by: FAMILY MEDICINE

## 2022-10-13 RX ORDER — UBIDECARENONE 50 MG
50 CAPSULE ORAL DAILY
Qty: 90 CAPSULE | Refills: 0 | Status: SHIPPED | OUTPATIENT
Start: 2022-10-13

## 2022-10-13 NOTE — PROGRESS NOTES
Assessment & Plan     Hyperlipidemia, unspecified hyperlipidemia type  Elevated CK  Cholesterol level is back up.  CK better.  Could consider pravastatin or pitavastatin as these are the least likely to cause myopathy.  Reasonable to try co-Q10 enzyme replacement to see if that improves myopathy.  - coenzyme Q-10 (CO-Q10) 50 MG capsule  Dispense: 90 capsule; Refill: 0  - Lipid panel reflex to direct LDL Fasting  - Basic metabolic panel  (Ca, Cl, CO2, Creat, Gluc, K, Na, BUN)  - CK total    Need for COVID-19 vaccine  - COVID-19,PF,PFIZER BOOSTER BIVALENT (12+YRS)    Needs flu shot  - INFLUENZA VACCINE IM > 6 MONTHS VALENT IIV4 (AFLURIA/FLUZONE)    Screen for colon cancer  Options discussed.  Patient preferred to follow-up with colonoscopy.  - Colonoscopy Screening  Referral      Return for Routine preventive.    Tobi Mendes MD  [facility]    Donovan Kline is a 47 year old accompanied by his Self, presenting for the following health issues:  office visit ([facility])      History of Present Illness       He eats 0-1 servings of fruits and vegetables daily.He consumes 1 sweetened beverage(s) daily.He exercises with enough effort to increase his heart rate 20 to 29 minutes per day.  He exercises with enough effort to increase his heart rate 3 or less days per week. He is missing 1 dose(s) of medications per week.  He is not taking prescribed medications regularly due to remembering to take.      Has been trying to find a statin that will not cause muscle aches.  Gets a lot of tightness in the shoulders.  Had elevated CPK and myalgias.  Had been most recently on Crestor 10 mg.  Then after discussion with Dr. Ray who is since retired he decreased it to 5 mg/day.  Taking it most days.  Still occasional tightness in his shoulders.    Patient is a .    Objective    /88 (BP Location: Right arm, Patient Position: Sitting, Cuff Size: Adult Large)   Pulse  "68   Temp 97.7  F (36.5  C) (Temporal)   Resp 20   Ht 1.793 m (5' 10.59\")   Wt 52.2 kg (115 lb)   SpO2 99%   BMI 16.23 kg/m    Body mass index is 16.23 kg/m .  Physical Exam   GENERAL: alert, not distressed  EARS: normal tympanic membranes and external auditory canals bilaterally  PHARYNX: no erythema or exudates  MOUTH: well hydrated mucosa, no lesions  NECK: no lymphadenopathy or thyroid nodules   CHEST: clear, no rales, rhonchi, or wheezes  CARDIAC: regular without murmur, gallop, or rub  ABDOMEN: soft, non tender, non distended, normal bowel sounds    Recent Results (from the past 240 hour(s))   Lipid panel reflex to direct LDL Fasting    Collection Time: 10/13/22 11:07 AM   Result Value Ref Range    Cholesterol 230 (H) <200 mg/dL    Triglycerides 262 (H) <150 mg/dL    Direct Measure HDL 40 >=40 mg/dL    LDL Cholesterol Calculated 138 (H) <=100 mg/dL    Non HDL Cholesterol 190 (H) <130 mg/dL   Basic metabolic panel  (Ca, Cl, CO2, Creat, Gluc, K, Na, BUN)    Collection Time: 10/13/22 11:07 AM   Result Value Ref Range    Sodium 139 136 - 145 mmol/L    Potassium 4.4 3.4 - 5.3 mmol/L    Chloride 101 98 - 107 mmol/L    Carbon Dioxide (CO2) 25 22 - 29 mmol/L    Anion Gap 13 7 - 15 mmol/L    Urea Nitrogen 14.6 6.0 - 20.0 mg/dL    Creatinine 1.05 0.67 - 1.17 mg/dL    Calcium 9.7 8.6 - 10.0 mg/dL    Glucose 101 (H) 70 - 99 mg/dL    GFR Estimate 88 >60 mL/min/1.73m2   CK total    Collection Time: 10/13/22 11:07 AM   Result Value Ref Range     39 - 308 U/L        "

## 2022-10-14 LAB — CK SERPL-CCNC: 149 U/L (ref 39–308)

## 2022-11-25 ENCOUNTER — TELEPHONE (OUTPATIENT)
Dept: FAMILY MEDICINE | Facility: CLINIC | Age: 47
End: 2022-11-25

## 2022-11-25 DIAGNOSIS — E78.5 HYPERLIPIDEMIA, UNSPECIFIED HYPERLIPIDEMIA TYPE: Primary | ICD-10-CM

## 2022-11-25 NOTE — TELEPHONE ENCOUNTER
General Call      Reason for Call:  Med question    What are your questions or concerns:  Pt called and stated that his last visit, he was under the impression that Dr. Mendes wanted to switch his rosuvastatin (CRESTOR) 5 MG tablet to a different medicine. Please call and advise. Thanks!    Date of last appointment with provider: 10/13/2022    Could we send this information to you in [employer] or would you prefer to receive a phone call?:   Patient would prefer a phone call   Okay to leave a detailed message?: Yes at Home number on file [phone] (home)

## 2022-11-28 NOTE — TELEPHONE ENCOUNTER
I called patient and discussed.  He is tolerating 5 rosuvastatin along with co-Q10 pretty well.  Muscle aches have not been a problem.  Previous blood test had been on 5 rosuvastatin.    Cholesterol numbers were somewhat high on 5 rosuvastatin.  He will try 10 rosuvastatin and continue the co-Q10.    We will recheck his levels (cholesterol, CK, ALT) in about 3 weeks.  He will monitor for side effects.

## 2023-02-03 DIAGNOSIS — E78.5 HYPERLIPIDEMIA, UNSPECIFIED HYPERLIPIDEMIA TYPE: ICD-10-CM

## 2023-02-03 RX ORDER — ROSUVASTATIN CALCIUM 5 MG/1
TABLET, COATED ORAL
Qty: 90 TABLET | Refills: 1 | Status: SHIPPED | OUTPATIENT
Start: 2023-02-03

## 2023-02-03 NOTE — TELEPHONE ENCOUNTER
Pt called and want the refill to be fill by today as he is completely out. Pt does have a lab appt for some blood work for this RX. Please send RX to pharmacy on file, thanks! Pt PCP not in, routing to DOD.

## 2023-02-06 ENCOUNTER — TELEPHONE (OUTPATIENT)
Dept: FAMILY MEDICINE | Facility: CLINIC | Age: 48
End: 2023-02-06

## 2023-02-06 ENCOUNTER — LAB (OUTPATIENT)
Dept: LAB | Facility: CLINIC | Age: 48
End: 2023-02-06
Payer: COMMERCIAL

## 2023-02-06 DIAGNOSIS — E78.5 HYPERLIPIDEMIA, UNSPECIFIED HYPERLIPIDEMIA TYPE: Primary | ICD-10-CM

## 2023-02-06 DIAGNOSIS — E78.5 HYPERLIPIDEMIA, UNSPECIFIED HYPERLIPIDEMIA TYPE: ICD-10-CM

## 2023-02-06 LAB
ALT SERPL W P-5'-P-CCNC: 43 U/L (ref 10–50)
ANION GAP SERPL CALCULATED.3IONS-SCNC: 14 MMOL/L (ref 7–15)
BUN SERPL-MCNC: 16.4 MG/DL (ref 6–20)
CALCIUM SERPL-MCNC: 9.2 MG/DL (ref 8.6–10)
CHLORIDE SERPL-SCNC: 104 MMOL/L (ref 98–107)
CHOLEST SERPL-MCNC: 190 MG/DL
CK SERPL-CCNC: 4087 U/L (ref 39–308)
CREAT SERPL-MCNC: 1.15 MG/DL (ref 0.67–1.17)
DEPRECATED HCO3 PLAS-SCNC: 25 MMOL/L (ref 22–29)
GFR SERPL CREATININE-BSD FRML MDRD: 79 ML/MIN/1.73M2
GLUCOSE SERPL-MCNC: 126 MG/DL (ref 70–99)
HDLC SERPL-MCNC: 37 MG/DL
LDLC SERPL CALC-MCNC: 121 MG/DL
NONHDLC SERPL-MCNC: 153 MG/DL
POTASSIUM SERPL-SCNC: 4.3 MMOL/L (ref 3.4–5.3)
SODIUM SERPL-SCNC: 143 MMOL/L (ref 136–145)
TRIGL SERPL-MCNC: 159 MG/DL

## 2023-02-06 PROCEDURE — 80048 BASIC METABOLIC PNL TOTAL CA: CPT

## 2023-02-06 PROCEDURE — 80061 LIPID PANEL: CPT

## 2023-02-06 PROCEDURE — 82550 ASSAY OF CK (CPK): CPT

## 2023-02-06 PROCEDURE — 36415 COLL VENOUS BLD VENIPUNCTURE: CPT

## 2023-02-06 PROCEDURE — 84460 ALANINE AMINO (ALT) (SGPT): CPT

## 2023-02-06 NOTE — TELEPHONE ENCOUNTER
RN received a call from [facility] regarding patient's CK total, which is  4087.      RN will route this encounter to  to review and advise.        Sylvia Latham RN  [facility]

## 2023-02-06 NOTE — TELEPHONE ENCOUNTER
RN consulted with .    Per , call patient to see how he is doing. If he has muscle ache or severe pain, may need to go to ER. If no pain, may recheck his cholesterol the next couple days.      Patient is currently taking Crestor 10 mg, instead of 5 mg. Patient has been taking that dosage Crestor 10 mg since October 2022.      Patient reported no aching at all.     Patient's insurance did not cover CO-Q 10  50 MG. Patient bought it from [employer] and he is not sure if he should take CO-Q 10 200 mg or 400 mg.      RN will route this encounter to  to review and advise.      Sylvia Latham RN  [facility]

## 2023-03-15 ENCOUNTER — LAB (OUTPATIENT)
Dept: LAB | Facility: CLINIC | Age: 48
End: 2023-03-15
Payer: COMMERCIAL

## 2023-03-15 DIAGNOSIS — R74.8 ELEVATED CK: ICD-10-CM

## 2023-03-15 LAB
ANION GAP SERPL CALCULATED.3IONS-SCNC: 10 MMOL/L (ref 7–15)
BUN SERPL-MCNC: 18 MG/DL (ref 6–20)
CALCIUM SERPL-MCNC: 9.5 MG/DL (ref 8.6–10)
CHLORIDE SERPL-SCNC: 101 MMOL/L (ref 98–107)
CK SERPL-CCNC: 106 U/L (ref 39–308)
CREAT SERPL-MCNC: 1.15 MG/DL (ref 0.67–1.17)
DEPRECATED HCO3 PLAS-SCNC: 27 MMOL/L (ref 22–29)
GFR SERPL CREATININE-BSD FRML MDRD: 79 ML/MIN/1.73M2
GLUCOSE SERPL-MCNC: 119 MG/DL (ref 70–99)
POTASSIUM SERPL-SCNC: 4.2 MMOL/L (ref 3.4–5.3)
SODIUM SERPL-SCNC: 138 MMOL/L (ref 136–145)

## 2023-03-15 PROCEDURE — 36415 COLL VENOUS BLD VENIPUNCTURE: CPT

## 2023-03-15 PROCEDURE — 82550 ASSAY OF CK (CPK): CPT

## 2023-03-15 PROCEDURE — 80048 BASIC METABOLIC PNL TOTAL CA: CPT

## 2023-03-26 ENCOUNTER — MYC MEDICAL ADVICE (OUTPATIENT)
Dept: FAMILY MEDICINE | Facility: CLINIC | Age: 48
End: 2023-03-26
Payer: COMMERCIAL

## 2023-03-26 DIAGNOSIS — E78.5 HYPERLIPIDEMIA, UNSPECIFIED HYPERLIPIDEMIA TYPE: Primary | ICD-10-CM

## 2023-06-07 ENCOUNTER — OFFICE VISIT (OUTPATIENT)
Dept: CARDIOLOGY | Facility: CLINIC | Age: 48
End: 2023-06-07
Attending: FAMILY MEDICINE
Payer: COMMERCIAL

## 2023-06-07 VITALS
WEIGHT: 200 LBS | DIASTOLIC BLOOD PRESSURE: 82 MMHG | RESPIRATION RATE: 16 BRPM | SYSTOLIC BLOOD PRESSURE: 130 MMHG | HEIGHT: 71 IN | BODY MASS INDEX: 28 KG/M2 | HEART RATE: 70 BPM

## 2023-06-07 DIAGNOSIS — Z78.9 STATIN INTOLERANCE: ICD-10-CM

## 2023-06-07 DIAGNOSIS — D68.59 PRIMARY HYPERCOAGULABLE STATE (H): ICD-10-CM

## 2023-06-07 DIAGNOSIS — Z82.49 FAMILY HISTORY OF PREMATURE CORONARY ARTERY DISEASE: ICD-10-CM

## 2023-06-07 DIAGNOSIS — M60.9 STATIN-INDUCED MYOSITIS: ICD-10-CM

## 2023-06-07 DIAGNOSIS — E78.01 FAMILIAL HYPERCHOLESTEROLEMIA: Primary | ICD-10-CM

## 2023-06-07 DIAGNOSIS — T46.6X5A STATIN-INDUCED MYOSITIS: ICD-10-CM

## 2023-06-07 LAB
CHOLEST SERPL-MCNC: 257 MG/DL
CK SERPL-CCNC: 176 U/L (ref 39–308)
HDLC SERPL-MCNC: 38 MG/DL
LDLC SERPL CALC-MCNC: 181 MG/DL
NONHDLC SERPL-MCNC: 219 MG/DL
TRIGL SERPL-MCNC: 188 MG/DL

## 2023-06-07 PROCEDURE — 99205 OFFICE O/P NEW HI 60 MIN: CPT | Performed by: INTERNAL MEDICINE

## 2023-06-07 PROCEDURE — 82550 ASSAY OF CK (CPK): CPT | Performed by: INTERNAL MEDICINE

## 2023-06-07 PROCEDURE — 36415 COLL VENOUS BLD VENIPUNCTURE: CPT | Performed by: INTERNAL MEDICINE

## 2023-06-07 PROCEDURE — 80061 LIPID PANEL: CPT | Performed by: INTERNAL MEDICINE

## 2023-06-07 NOTE — LETTER
6/7/2023    Tobi Mendes MD  [address] [place] [address]    RE: Eliud Groves       Dear Colleague,     I had the pleasure of seeing Eliud Groves in the [facility].  Lipid    HEART CARE ENCOUNTER CONSULTATON [facility]  [phone]      Assessment/Recommendations   Assessment:   1.  Probable familial hypercholesterolemia. LDL:220, 2/9/21, TC: 292,  (off statin).  Father with premature CAD < age 50.  (Javier Rush Criteria)  2.  Family history of premature coronary disease  3.  Statin-induced myositis. CK> 4000 on Crestor 2/6/23.  Patient has experienced myositis with atorvastatin and simvastatin. CK normal off statin (176 today)  4. ASCVD risk: > 7.5 %  5.  Factor V Leiden    Plan:   1.  Repeat lipids today  2.  On discussion with patient regarding probable familial hypercholesterolemia given his LDL levels and family history  3.  Recommend coronary calcium scoring to define cardiovascular risk and further testing   4.  Discussed aspirin therapy.  If calcium score greater than 100 recommend starting aspirin  5.  Coronary calcium score greater than 100 recommend treadmill ECG stress test  6.  Coronary calcium score greater than 400 with strongly recommend initiation of PCSK9 inhibitors, calcium greater than 100 would recommend initiation of PCSK9 inhibitor given patient cannot tolerate statin and had statin induced myositis given concern for probable familial hypercholesterolemia.  7.  Continue exercise  8.  Discussed PCSK9 inhibitors in detail  9.  Continue diet modifications       History of Present Illness/Subjective    HPI: Eliud Groves is a 48 year old male with strong family history of premature coronary disease, severe hypercholesterolemia who presents to cardiology clinic for statin induced myositis, statin intolerance, hyperlipidemia and family history of premature coronary disease.    Currently patient has no active cardiac symptoms.  He  "denies any dyspnea on exertion, chest pain with exertion, no syncope no orthopnea no lower extremity edema.  Still history significant for premature coronary artery disease with a myocardial infarction in his father under the age of 50.  He has multiple aunts and uncles on his father side under the age of 50 with cardiovascular disease requiring revascularization.      He has tried multiple statins due to severely elevated LDL levels of greater than 200.  With statin therapy he has developed myositis.  He had noticed aches in his muscles which prompted further evaluation.  He had tried a atorvastatin simvastatin and now rosuvastatin all resulting in myositis requiring discontinuation of medication.    I feel he is at significant long-term risk due to the elevation in his LDL.  He does live a very healthy lifestyle.  Monitors his dietary intake as well.  Exercises regularly.  He has not undergone coronary calcium scoring to determine if he has evidence of coronary calcification.  Risk stratification demonstrates elevated risk of cardiovascular events long-term.    Long conversation with the patient regarding management, genetics, medical therapy    Echocardiogram Results: No recent       Physical Examination  Review of Systems   Vitals: /82 (BP Location: Right arm, Patient Position: Sitting, Cuff Size: Adult Large)   Pulse 70   Resp 16   Ht 1.793 m (5' 10.59\")   Wt 90.7 kg (200 lb)   BMI 28.22 kg/m    BMI= Body mass index is 28.22 kg/m .  Wt Readings from Last 3 Encounters:   06/07/23 90.7 kg (200 lb)   10/13/22 52.2 kg (115 lb)   07/23/21 87.7 kg (193 lb 4 oz)        Pleasant   ENT/Mouth: membranes moist, no oral lesions or bleeding gums.      EYES:  no scleral icterus, normal conjunctivae       Chest/Lungs:   lungs are clear to auscultation, no rales or wheezing, no sternal scar, equal chest wall expansion    Cardiovascular:   Regular. Normal first and second heart sounds with no murmurs, rubs, or " gallops; the carotid, radial and posterior tibial pulses are intact, Jugular venous pressure noraml, no edema bilaterally    Abdomen:  no tenderness; bowel sounds are present   Extremities: no cyanosis or clubbing   Skin: no xanthelasma, warm.    Neurologic: normal  bilateral, no tremors     Psychiatric: alert and oriented x3, calm        Please refer above for cardiac ROS details.        Medical History  Surgical History Family History Social History   Hyperlipidemia No prior cardiac surgery    Father with myocardial infarction under 49 yo   Social History     Socioeconomic History    Marital status:      Spouse name: Not on file    Number of children: Not on file    Years of education: Not on file    Highest education level: Not on file   Occupational History    Not on file   Tobacco Use    Smoking status: Never    Smokeless tobacco: Never   Vaping Use    Vaping status: Not on file   Substance and Sexual Activity    Alcohol use: Not on file    Drug use: Not on file    Sexual activity: Not on file   Other Topics Concern    Not on file   Social History Narrative    Not on file     Social Determinants of Health     Financial Resource Strain: Not on file   Food Insecurity: Not on file   Transportation Needs: Not on file   Physical Activity: Not on file   Stress: Not on file   Social Connections: Not on file   Intimate Partner Violence: Not on file   Housing Stability: Not on file           Medications  Allergies   Current Outpatient Medications   Medication Sig Dispense Refill    coenzyme Q-10 (CO-Q10) 50 MG capsule Take 1 capsule (50 mg) by mouth daily (Patient not taking: Reported on 6/7/2023) 90 capsule 0    rosuvastatin (CRESTOR) 5 MG tablet TAKE 1 TABLET BY MOUTH EVERYDAY AT BEDTIME (Patient not taking: Reported on 6/7/2023) 90 tablet 1       Allergies   Allergen Reactions    Atorvastatin Muscle Pain (Myalgia)    Simvastatin Unknown     Elevated CPK          Lab Results    Chemistry/lipid CBC Cardiac  Enzymes/BNP/TSH/INR   Recent Labs   Lab Test 02/06/23  0724   CHOL 190   HDL 37*   *   TRIG 159*     Recent Labs   Lab Test 02/06/23  0724 10/13/22  1107 10/13/21  0817   * 138* 120     Recent Labs   Lab Test 03/15/23  0727      POTASSIUM 4.2   CHLORIDE 101   CO2 27   *   BUN 18.0   CR 1.15   GFRESTIMATED 79   DWIGHT 9.5     Recent Labs   Lab Test 03/15/23  0727 02/06/23  0724 10/13/22  1107   CR 1.15 1.15 1.05     Recent Labs   Lab Test 05/05/15  0815   A1C 5.7          Recent Labs   Lab Test 09/01/20  0917   WBC 4.6   HGB 16.1   HCT 48.6   MCV 87        Recent Labs   Lab Test 09/01/20  0917   HGB 16.1    No results for input(s): TROPONINI in the last 23564 hours.  No results for input(s): BNP, NTBNPI, NTBNP in the last 89039 hours.  No results for input(s): TSH in the last 57121 hours.  Recent Labs   Lab Test 09/01/20  0913   INR 0.92        Dio Harden DO    Today's clinic visit entailed:  70 minutes spent by me on the date of the encounter doing chart review, history and exam, documentation and further activities per the note.  Extensive conversation with the patient regarding hypercholesterolemia.  Discussed family history in detail.  Discussed familial or hypercholesterolemia in detail.  Discussed medication such as PCSK9 inhibitor.  Discussed inclisiran as well.  Discussed trial data.  Discussed statin induced myositis.        Thank you for allowing me to participate in the care of your patient.      Sincerely,     Dio Harden DO     [facility]  cc:   Tobi Mendes MD  [address] [place] [address]

## 2023-06-07 NOTE — RESULT ENCOUNTER NOTE
We will await results of coronary calcium score.  We will then work with insurance to get patient on a PCSK9 inhibitor.

## 2023-06-07 NOTE — PROGRESS NOTES
Lipid    HEART CARE ENCOUNTER CONSULTATON [facility]  [phone]      Assessment/Recommendations   Assessment:   1.  Probable familial hypercholesterolemia. LDL:220, 2/9/21, TC: 292,  (off statin).  Father with premature CAD < age 50.  (Javier Pembina Criteria)  2.  Family history of premature coronary disease  3.  Statin-induced myositis. CK> 4000 on Crestor 2/6/23.  Patient has experienced myositis with atorvastatin and simvastatin. CK normal off statin (176 today)  4. ASCVD risk: > 7.5 %  5.  Factor V Leiden    Plan:   1.  Repeat lipids today  2.  On discussion with patient regarding probable familial hypercholesterolemia given his LDL levels and family history  3.  Recommend coronary calcium scoring to define cardiovascular risk and further testing   4.  Discussed aspirin therapy.  If calcium score greater than 100 recommend starting aspirin  5.  Coronary calcium score greater than 100 recommend treadmill ECG stress test  6.  Coronary calcium score greater than 400 with strongly recommend initiation of PCSK9 inhibitors, calcium greater than 100 would recommend initiation of PCSK9 inhibitor given patient cannot tolerate statin and had statin induced myositis given concern for probable familial hypercholesterolemia.  7.  Continue exercise  8.  Discussed PCSK9 inhibitors in detail  9.  Continue diet modifications       History of Present Illness/Subjective    HPI: Eliud Groves is a 48 year old male with strong family history of premature coronary disease, severe hypercholesterolemia who presents to cardiology clinic for statin induced myositis, statin intolerance, hyperlipidemia and family history of premature coronary disease.    Currently patient has no active cardiac symptoms.  He denies any dyspnea on exertion, chest pain with exertion, no syncope no orthopnea no lower extremity edema.  Still history significant for premature coronary artery disease with a myocardial  "infarction in his father under the age of 50.  He has multiple aunts and uncles on his father side under the age of 50 with cardiovascular disease requiring revascularization.      He has tried multiple statins due to severely elevated LDL levels of greater than 200.  With statin therapy he has developed myositis.  He had noticed aches in his muscles which prompted further evaluation.  He had tried a atorvastatin simvastatin and now rosuvastatin all resulting in myositis requiring discontinuation of medication.    I feel he is at significant long-term risk due to the elevation in his LDL.  He does live a very healthy lifestyle.  Monitors his dietary intake as well.  Exercises regularly.  He has not undergone coronary calcium scoring to determine if he has evidence of coronary calcification.  Risk stratification demonstrates elevated risk of cardiovascular events long-term.    Long conversation with the patient regarding management, genetics, medical therapy    Echocardiogram Results: No recent       Physical Examination  Review of Systems   Vitals: /82 (BP Location: Right arm, Patient Position: Sitting, Cuff Size: Adult Large)   Pulse 70   Resp 16   Ht 1.793 m (5' 10.59\")   Wt 90.7 kg (200 lb)   BMI 28.22 kg/m    BMI= Body mass index is 28.22 kg/m .  Wt Readings from Last 3 Encounters:   06/07/23 90.7 kg (200 lb)   10/13/22 52.2 kg (115 lb)   07/23/21 87.7 kg (193 lb 4 oz)        Pleasant   ENT/Mouth: membranes moist, no oral lesions or bleeding gums.      EYES:  no scleral icterus, normal conjunctivae       Chest/Lungs:   lungs are clear to auscultation, no rales or wheezing, no sternal scar, equal chest wall expansion    Cardiovascular:   Regular. Normal first and second heart sounds with no murmurs, rubs, or gallops; the carotid, radial and posterior tibial pulses are intact, Jugular venous pressure noraml, no edema bilaterally    Abdomen:  no tenderness; bowel sounds are present   Extremities: no " cyanosis or clubbing   Skin: no xanthelasma, warm.    Neurologic: normal  bilateral, no tremors     Psychiatric: alert and oriented x3, calm        Please refer above for cardiac ROS details.        Medical History  Surgical History Family History Social History   Hyperlipidemia No prior cardiac surgery    Father with myocardial infarction under 51 yo   Social History     Socioeconomic History     Marital status:      Spouse name: Not on file     Number of children: Not on file     Years of education: Not on file     Highest education level: Not on file   Occupational History     Not on file   Tobacco Use     Smoking status: Never     Smokeless tobacco: Never   Vaping Use     Vaping status: Not on file   Substance and Sexual Activity     Alcohol use: Not on file     Drug use: Not on file     Sexual activity: Not on file   Other Topics Concern     Not on file   Social History Narrative     Not on file     Social Determinants of Health     Financial Resource Strain: Not on file   Food Insecurity: Not on file   Transportation Needs: Not on file   Physical Activity: Not on file   Stress: Not on file   Social Connections: Not on file   Intimate Partner Violence: Not on file   Housing Stability: Not on file           Medications  Allergies   Current Outpatient Medications   Medication Sig Dispense Refill     coenzyme Q-10 (CO-Q10) 50 MG capsule Take 1 capsule (50 mg) by mouth daily (Patient not taking: Reported on 6/7/2023) 90 capsule 0     rosuvastatin (CRESTOR) 5 MG tablet TAKE 1 TABLET BY MOUTH EVERYDAY AT BEDTIME (Patient not taking: Reported on 6/7/2023) 90 tablet 1       Allergies   Allergen Reactions     Atorvastatin Muscle Pain (Myalgia)     Simvastatin Unknown     Elevated CPK          Lab Results    Chemistry/lipid CBC Cardiac Enzymes/BNP/TSH/INR   Recent Labs   Lab Test 02/06/23  0724   CHOL 190   HDL 37*   *   TRIG 159*     Recent Labs   Lab Test 02/06/23  0724 10/13/22  1107 10/13/21  0817    * 138* 120     Recent Labs   Lab Test 03/15/23  0727      POTASSIUM 4.2   CHLORIDE 101   CO2 27   *   BUN 18.0   CR 1.15   GFRESTIMATED 79   DWIGHT 9.5     Recent Labs   Lab Test 03/15/23  0727 02/06/23  0724 10/13/22  1107   CR 1.15 1.15 1.05     Recent Labs   Lab Test 05/05/15  0815   A1C 5.7          Recent Labs   Lab Test 09/01/20  0917   WBC 4.6   HGB 16.1   HCT 48.6   MCV 87        Recent Labs   Lab Test 09/01/20  0917   HGB 16.1    No results for input(s): TROPONINI in the last 09183 hours.  No results for input(s): BNP, NTBNPI, NTBNP in the last 34613 hours.  No results for input(s): TSH in the last 16862 hours.  Recent Labs   Lab Test 09/01/20  0913   INR 0.92        Dio Harden DO    Today's clinic visit entailed:  70 minutes spent by me on the date of the encounter doing chart review, history and exam, documentation and further activities per the note.  Extensive conversation with the patient regarding hypercholesterolemia.  Discussed family history in detail.  Discussed familial or hypercholesterolemia in detail.  Discussed medication such as PCSK9 inhibitor.  Discussed inclisiran as well.  Discussed trial data.  Discussed statin induced myositis.

## 2023-06-23 ENCOUNTER — HOSPITAL ENCOUNTER (OUTPATIENT)
Dept: CT IMAGING | Facility: CLINIC | Age: 48
Discharge: HOME | End: 2023-06-23
Attending: INTERNAL MEDICINE | Admitting: INTERNAL MEDICINE
Payer: COMMERCIAL

## 2023-06-23 DIAGNOSIS — T46.6X5A STATIN-INDUCED MYOSITIS: ICD-10-CM

## 2023-06-23 DIAGNOSIS — Z82.49 FAMILY HISTORY OF PREMATURE CORONARY ARTERY DISEASE: ICD-10-CM

## 2023-06-23 DIAGNOSIS — M60.9 STATIN-INDUCED MYOSITIS: ICD-10-CM

## 2023-06-23 LAB
CV CALCIUM SCORE AGATSTON LM: 0
CV CALCIUM SCORING AGATSON LAD: 9
CV CALCIUM SCORING AGATSTON CX: 0
CV CALCIUM SCORING AGATSTON RCA: 0
CV CALCIUM SCORING AGATSTON TOTAL: 9

## 2023-06-23 PROCEDURE — 75571 CT HRT W/O DYE W/CA TEST: CPT

## 2023-06-23 PROCEDURE — 75571 CT HRT W/O DYE W/CA TEST: CPT | Mod: 26 | Performed by: INTERNAL MEDICINE

## 2023-06-23 NOTE — RESULT ENCOUNTER NOTE
Please inform patient. Calcium score is low.  Recommend Zetia 10 mg daily.  Repeat calcium score in 3 years.  Repeat fasting lipids in 2 months. If LDL remains very high on zetia could attempt pre-auth for Repatha.

## 2023-06-27 DIAGNOSIS — E78.01 FAMILIAL HYPERCHOLESTEROLEMIA: ICD-10-CM

## 2023-06-27 DIAGNOSIS — Z82.49 FAMILY HISTORY OF PREMATURE CORONARY ARTERY DISEASE: Primary | ICD-10-CM

## 2023-06-27 RX ORDER — EZETIMIBE 10 MG/1
10 TABLET ORAL DAILY
Qty: 90 TABLET | Refills: 0 | Status: SHIPPED | OUTPATIENT
Start: 2023-06-27 | End: 2023-09-14

## 2023-09-14 ENCOUNTER — MYC REFILL (OUTPATIENT)
Dept: CARDIOLOGY | Facility: CLINIC | Age: 48
End: 2023-09-14
Payer: COMMERCIAL

## 2023-09-14 DIAGNOSIS — Z82.49 FAMILY HISTORY OF PREMATURE CORONARY ARTERY DISEASE: ICD-10-CM

## 2023-09-14 DIAGNOSIS — E78.01 FAMILIAL HYPERCHOLESTEROLEMIA: ICD-10-CM

## 2023-09-14 RX ORDER — EZETIMIBE 10 MG/1
10 TABLET ORAL DAILY
Qty: 90 TABLET | Refills: 0 | Status: SHIPPED | OUTPATIENT
Start: 2023-09-14

## 2023-09-18 ENCOUNTER — LAB (OUTPATIENT)
Dept: LAB | Facility: CLINIC | Age: 48
End: 2023-09-18
Payer: COMMERCIAL

## 2023-09-18 DIAGNOSIS — Z82.49 FAMILY HISTORY OF PREMATURE CORONARY ARTERY DISEASE: ICD-10-CM

## 2023-09-18 DIAGNOSIS — E78.01 FAMILIAL HYPERCHOLESTEROLEMIA: ICD-10-CM

## 2023-09-18 LAB
CHOLEST SERPL-MCNC: 245 MG/DL
HDLC SERPL-MCNC: 41 MG/DL
LDLC SERPL CALC-MCNC: 171 MG/DL
NONHDLC SERPL-MCNC: 204 MG/DL
TRIGL SERPL-MCNC: 166 MG/DL

## 2023-09-18 PROCEDURE — 36415 COLL VENOUS BLD VENIPUNCTURE: CPT

## 2023-09-18 PROCEDURE — 80061 LIPID PANEL: CPT

## 2023-10-04 ENCOUNTER — TELEPHONE (OUTPATIENT)
Dept: CARDIOLOGY | Facility: CLINIC | Age: 48
End: 2023-10-04
Payer: COMMERCIAL

## 2023-10-04 DIAGNOSIS — Z82.49 FAMILY HISTORY OF PREMATURE CORONARY ARTERY DISEASE: ICD-10-CM

## 2023-10-04 DIAGNOSIS — T46.6X5A STATIN-INDUCED MYOSITIS: ICD-10-CM

## 2023-10-04 DIAGNOSIS — M60.9 STATIN-INDUCED MYOSITIS: ICD-10-CM

## 2023-10-04 DIAGNOSIS — E78.01 FAMILIAL HYPERCHOLESTEROLEMIA: Primary | ICD-10-CM

## 2023-10-04 DIAGNOSIS — Z78.9 STATIN INTOLERANCE: ICD-10-CM

## 2023-10-04 NOTE — TELEPHONE ENCOUNTER
Central Prior Authorization Team   Phone: [phone]    PA Initiation    Medication: Repatha 140mg/ml  Insurance Company: [employer] - Phone [phone] Fax [phone]  [id number], [place] - [address]  Filling Pharmacy Phone: [phone]  Filling Pharmacy Fax:    Start Date: 10/4/2023    Finished through [employer]

## 2023-10-04 NOTE — TELEPHONE ENCOUNTER
Prior Authorization Approval    Authorization Effective Date: 10/4/2023  Authorization Expiration Date: 10/4/2024  Medication: Repatha 140mg/ml  Approved Dose/Quantity:   Reference #:     Insurance Company: [employer] - Phone [phone] Fax [phone]  Expected CoPay:       CoPay Card Available:      Foundation Assistance Needed:    Which Pharmacy is filling the prescription (Not needed for infusion/clinic administered): [id number] - [facility] - [place], [place] - [address]  Pharmacy Notified:  yes  Patient Notified:  yes  Pharmacy will contact patient when ready to /ship

## 2023-10-04 NOTE — TELEPHONE ENCOUNTER
---- Message -----  From: Dio Harden DO  Sent: 10/3/2023  11:17 AM CDT  To: Vinh Meza RN  Subject: FW: Next Steps                                     would recommend we start him on Repatha 140 mg q. 14 days.  He is on highest tolerated statin does along with Zetia.  LDL remains 170s.  He does have possible hyper familial cholesterolemia.  Insurance may require genetic testing but would not perform genetic testing unless required by insurance to cover medication.

## 2023-10-14 ENCOUNTER — HEALTH MAINTENANCE LETTER (OUTPATIENT)
Age: 48
End: 2023-10-14

## 2024-01-05 ENCOUNTER — LAB (OUTPATIENT)
Dept: LAB | Facility: HOSPITAL | Age: 49
End: 2024-01-05
Payer: COMMERCIAL

## 2024-01-05 DIAGNOSIS — E78.01 FAMILIAL HYPERCHOLESTEROLEMIA: ICD-10-CM

## 2024-01-05 LAB
ALBUMIN SERPL BCG-MCNC: 4.4 G/DL (ref 3.5–5.2)
ALP SERPL-CCNC: 80 U/L (ref 40–150)
ALT SERPL W P-5'-P-CCNC: 21 U/L (ref 0–70)
AST SERPL W P-5'-P-CCNC: 20 U/L (ref 0–45)
BILIRUB DIRECT SERPL-MCNC: <0.2 MG/DL (ref 0–0.3)
BILIRUB SERPL-MCNC: 0.6 MG/DL
CHOLEST SERPL-MCNC: 137 MG/DL
FASTING STATUS PATIENT QL REPORTED: YES
HDLC SERPL-MCNC: 44 MG/DL
LDLC SERPL CALC-MCNC: 56 MG/DL
NONHDLC SERPL-MCNC: 93 MG/DL
PROT SERPL-MCNC: 6.4 G/DL (ref 6.4–8.3)
TRIGL SERPL-MCNC: 183 MG/DL

## 2024-01-05 PROCEDURE — 80076 HEPATIC FUNCTION PANEL: CPT

## 2024-01-05 PROCEDURE — 84478 ASSAY OF TRIGLYCERIDES: CPT

## 2024-01-05 PROCEDURE — 36415 COLL VENOUS BLD VENIPUNCTURE: CPT

## 2024-01-05 PROCEDURE — 82465 ASSAY BLD/SERUM CHOLESTEROL: CPT

## 2024-01-24 NOTE — PROGRESS NOTES
"Subjective: This patient comes in for thumb injury he was on a water mat this summer in mid July and had his thumb \"been `    It still bothering him.    He has tenderness over the ulnar collateral ligament does not really open up significantly and I did get an x-ray which looked okay.    We discussed bracing but he really does not want to do that and does not feel like it is too bad.  I also discussed seeing an orthopedist who may want to check an MRI.    He ultimately decided to just hold on and see how things go over the next few weeks if persisting problems we will have him see the hand surgeon    Also follows up on his lipids he continues on the generic Crestor at 10 mg a day.  He has had no significant side effects I did check CPK AST ALT and lipid and that looked good LDL was at 111.  Normal liver and CPK.    Tobacco status: He  reports that he has never smoked. He does not have any smokeless tobacco history on file.    Patient Active Problem List    Diagnosis Date Noted     Factor V Leiden Mutation      Influenza      Cough      Hyperlipidemia        Current Outpatient Prescriptions   Medication Sig Dispense Refill     rosuvastatin (CRESTOR) 10 MG tablet Take 1 tablet (10 mg total) by mouth daily. 90 tablet 3     No current facility-administered medications for this visit.        ROS:   Review of systems negative other than as outlined above    Objective:    /86 (Patient Site: Left Arm, Patient Position: Sitting, Cuff Size: Adult Large)  Pulse 77  Temp 97.7  F (36.5  C) (Oral)   Resp 12  Ht 5' 10.75\" (1.797 m)  Wt 194 lb (88 kg)  SpO2 96% Comment: at rest with room air  BMI 27.25 kg/m2  Body mass index is 27.25 kg/(m^2).      General appearance no acute distress    Vital signs were stable O2 sat looked good    Lungs were clear no rales or rhonchi heart was regular S1-S2 rate in 70s    Extremities without edema    Patient has normal sensation and pulses are the hand he has some tenderness along " Pharmacy faxed refill request    the ulnar collateral ligament of the left thumb.    Patient has no significant opening up his x-ray looked normal.  Radiology read some soft tissue swelling only at the MCP joint.    Labs as outlined    Results for orders placed or performed in visit on 10/04/17   Lipid [place] FASTING   Result Value Ref Range    Cholesterol 182 <=199 mg/dL    Triglycerides 151 (H) <=149 mg/dL    HDL Cholesterol 41 >=40 mg/dL    LDL Calculated 111 <=129 mg/dL    Patient Fasting > 8hrs? Yes    AST (SGOT)   Result Value Ref Range    AST 17 0 - 40 U/L   ALT (SGPT)   Result Value Ref Range    ALT 19 0 - 45 U/L   CK Total   Result Value Ref Range    CK, Total 165 30 - 190 U/L       Assessment:  1. Sprain of ulnar collateral ligament of metacarpophalangeal (MCP) joint of left thumb  XR Finger Left 2 or More VWS   2. Hyperlipidemia  Lipid [place] FASTING    AST (SGOT)    ALT (SGPT)    CK Total     Sprain of the ulnar collateral ligament MCP joint of left thumb please see above discussion.  Await his call back if he wants to go on for further evaluation with a hand surgeon.    Hyperlipidemia controlled with generic Crestor 10 mg daily stay on the same    Plan: As outlined above    This transcription uses voice recognition software, which may contain typographical errors.

## 2024-06-03 ENCOUNTER — MYC MEDICAL ADVICE (OUTPATIENT)
Dept: FAMILY MEDICINE | Facility: CLINIC | Age: 49
End: 2024-06-03
Payer: COMMERCIAL

## 2024-06-06 NOTE — TELEPHONE ENCOUNTER
Writer replied to patient via [employer].  YOUSIF FalconN, RN (she/her)  [facility] RN

## 2024-09-15 DIAGNOSIS — Z82.49 FAMILY HISTORY OF PREMATURE CORONARY ARTERY DISEASE: ICD-10-CM

## 2024-09-15 DIAGNOSIS — Z78.9 STATIN INTOLERANCE: ICD-10-CM

## 2024-09-15 DIAGNOSIS — T46.6X5A STATIN-INDUCED MYOSITIS: ICD-10-CM

## 2024-09-15 DIAGNOSIS — M60.9 STATIN-INDUCED MYOSITIS: ICD-10-CM

## 2024-09-15 DIAGNOSIS — E78.01 FAMILIAL HYPERCHOLESTEROLEMIA: ICD-10-CM

## 2024-09-17 RX ORDER — EVOLOCUMAB 140 MG/ML
140 INJECTION, SOLUTION SUBCUTANEOUS
Qty: 6 ML | Refills: 0 | Status: SHIPPED | OUTPATIENT
Start: 2024-09-17

## 2024-09-17 NOTE — TELEPHONE ENCOUNTER
Pt last seen 6/2023 and last FLP 1/2024. Order placed for follow-up appt and msg sent to sched to arrange. Rx refill request approved for 3 month supply. nataly

## 2024-09-24 ENCOUNTER — TELEPHONE (OUTPATIENT)
Dept: CARDIOLOGY | Facility: CLINIC | Age: 49
End: 2024-09-24
Payer: COMMERCIAL

## 2024-09-24 NOTE — TELEPHONE ENCOUNTER
PA Initiation    Medication:  Repatha Sureclick 140 mg/ml  Insurance Company:  [employer] [facility]  [facility]  [facility] [address], [place], [place]  Filling Pharmacy Phone:  [phone]  Filling Pharmacy Fax:  [phone]  Start Date:   10/2023    Insurance Info:  Name: [employer] [facility]  Cardholder ID: [id number]  Group Number: [id number]  Person Code: [id number]  TP Help Desk Phone: [phone]    Thank you. nataly

## 2024-09-24 NOTE — TELEPHONE ENCOUNTER
Central Prior Authorization Team   Phone: [phone]    PA Initiation    Medication: Repatha SureClick 140MG/ML auto-injectors    Insurance Company: [employer] - Phone [phone] Fax [phone]  [id number], [place] - [address]  Filling Pharmacy Phone: [phone]  Filling Pharmacy Fax:    Start Date: 9/24/2024

## 2024-09-25 NOTE — TELEPHONE ENCOUNTER
Prior Authorization Approval    Authorization Effective Date: 8/25/2024  Authorization Expiration Date: 9/24/2025  Medication: Repatha SureClick 140MG/ML auto-injectors    Approved Dose/Quantity:   Reference #:     Insurance Company: [employer] - Phone [phone] Fax [phone]  Expected CoPay:       CoPay Card Available:      Foundation Assistance Needed:    Which Pharmacy is filling the prescription (Not needed for infusion/clinic administered): [id number], [place] - [address]  Pharmacy Notified:  yes  Patient Notified:  yes- Pharmacy will contact patient when ready to /ship

## 2024-11-19 ENCOUNTER — OFFICE VISIT (OUTPATIENT)
Dept: CARDIOLOGY | Facility: CLINIC | Age: 49
End: 2024-11-19
Payer: COMMERCIAL

## 2024-11-19 VITALS
HEART RATE: 76 BPM | WEIGHT: 197 LBS | SYSTOLIC BLOOD PRESSURE: 124 MMHG | BODY MASS INDEX: 27.8 KG/M2 | RESPIRATION RATE: 14 BRPM | DIASTOLIC BLOOD PRESSURE: 86 MMHG

## 2024-11-19 DIAGNOSIS — E78.01 FAMILIAL HYPERCHOLESTEROLEMIA: ICD-10-CM

## 2024-11-19 DIAGNOSIS — Z78.9 STATIN INTOLERANCE: ICD-10-CM

## 2024-11-19 DIAGNOSIS — Z82.49 FAMILY HISTORY OF PREMATURE CORONARY ARTERY DISEASE: ICD-10-CM

## 2024-11-19 DIAGNOSIS — T46.6X5A STATIN-INDUCED MYOSITIS: ICD-10-CM

## 2024-11-19 DIAGNOSIS — M60.9 STATIN-INDUCED MYOSITIS: ICD-10-CM

## 2024-11-19 PROCEDURE — 99214 OFFICE O/P EST MOD 30 MIN: CPT | Performed by: PHYSICIAN ASSISTANT

## 2024-11-19 RX ORDER — EVOLOCUMAB 140 MG/ML
140 INJECTION, SOLUTION SUBCUTANEOUS
Qty: 6 ML | Refills: 3 | Status: SHIPPED | OUTPATIENT
Start: 2024-11-19

## 2024-11-19 NOTE — LETTER
11/19/2024    Tobi Mendes MD  [address] [place] [address]    RE: Eliud BLANCHARD Los Ybanez       Dear Colleague,     I had the [facility] in the [facility].          HEART CARE FOLLOW UP    Primary Care: Tobi Mendes MD  Primary Cardiologist: Dr Harden      Assessment/Recommendations     Dyslipidemia  probable family hypercholesterolemia  CT coronary calcium score 9   Int he past,  and total cholesterol 292.  He has a strong family history of premature coronary artery disease with his father having an event at the age of less than 50.  He has had statin induced myositis with a CK greater than 4000 on Crestor in February 2023 and is also experienced myositis with atorvastatin and simvastatin.  He has subsequently been started on Zetia 10 Primitivo daily and Repatha.  Most recent lipid panel in January of this year showed an LDL of 56.  Continue Zetia 10 mg daily  Continue Repatha 140 mg every 14 days  Avoid statin therapy  Lifestyle management-discussed importance of regular exercise and heart healthy diet      Return to care in  2 years  with Dr. Harden.      History of Present Illness/Subjective    Eliud Groves is a 49 year old male with past medical history significant for:  Hypercholesterolemia, severe  History of statin induced myositis  Hyperlipidemia  Family history of premature coronary artery disease    The patient was last seen in in clinic in June 2023 by my colleague Dr. Harden.  At that time the patient was doing well, he was active without any exertional limitations or cardiac symptoms.  He was noted to have tried multiple statins due to elevation of his LDL and lived a very healthy lifestyle, exercising regularly and monitoring his dietary intake.    Today the patient tells me he's been feeling well. He is tolerating Repatha without side effects. He works out a few times per week, does an [employer] that provides Home workouts. He an work out intensely for  20-30 minutes without limitations and is active outside. Patient denies chest pain, pressure and tightness. No shortness of breath or dyspnea on exertion. No dizziness, lightheadedness, pre-syncope or syncope. No palpitations or racing heart. Sleeping very well without orthopnea or PND. No leg swelling or cramping. No claudication symptoms. No blood in stool or urine. No fevers, chills or cough.          Data Review Today:     CT coronary calcium screening June 2023:  Calcium Scoring The total Agatston score is 9.  A calcium score in this range places the individual in the 25th percentile when compared to an age and gender matched control group and implies a low risk of cardiac events in the next ten years (less than 1% per year).         I have reviewed and updated the patient's past medical history, allergy list and medication list.          Physical Examination   Vitals: /86 (BP Location: Right arm, Patient Position: Sitting, Cuff Size: Adult Regular)   Pulse 76   Resp 14   Wt 89.4 kg (197 lb)   BMI 27.80 kg/m      BMI= Body mass index is 27.8 kg/m .    Wt Readings from Last 3 Encounters:   11/19/24 89.4 kg (197 lb)   06/07/23 90.7 kg (200 lb)   10/13/22 52.2 kg (115 lb)       General :   Alert and oriented, in no acute distress.    HEENT:  Normocephalic and atraumatic. .    Neck: No JVP, carotid bruit or obvious thyromegaly.   Lungs:   Respirations unlabored. Clear bilaterally with no rales, rhonchi, or wheezes.     Cardiovascular:   Rhythm is regular. S1 and S2 are normal. o significant murmur is present. Lower extremities demonstrate no significant edema.        Skin: Skin is warm, dry, and otherwise intact.   Neurologic: Gait not assessed. Mood and affect appropriate.           Medical History  Surgical History Family History Social History   No past medical history on file. No past surgical history on file. No family history on file. Social History     Socioeconomic History     Marital status:       Spouse name: Not on file     Number of children: Not on file     Years of education: Not on file     Highest education level: Not on file   Occupational History     Not on file   Tobacco Use     Smoking status: Never     Smokeless tobacco: Never   Substance and Sexual Activity     Alcohol use: Not on file     Drug use: Not on file     Sexual activity: Not on file   Other Topics Concern     Not on file   Social History Narrative     Not on file     Social Drivers of Health     Financial Resource Strain: Not on file   Food Insecurity: Not on file   Transportation Needs: Not on file   Physical Activity: Not on file   Stress: Not on file   Social Connections: Not on file   Interpersonal Safety: Not on file   Housing Stability: Not on file          Medications  Allergies   Scheduled Meds:  Current Outpatient Medications   Medication Sig Dispense Refill     evolocumab (REPATHA SURECLICK) 140 MG/ML prefilled autoinjector Inject 1 mL (140 mg) subcutaneously every 14 days. 6 mL 3    Allergies   Allergen Reactions     Atorvastatin Muscle Pain (Myalgia)     Rosuvastatin Muscle Pain (Myalgia)     Severe myositis     Simvastatin Unknown     Elevated CPK         Lab Results    Chemistry/lipid CBC Cardiac Enzymes/BNP/TSH/INR   Lab Results   Component Value Date    CHOL 137 01/05/2024    HDL 44 01/05/2024    TRIG 183 (H) 01/05/2024    BUN 18.0 03/15/2023     03/15/2023    CO2 27 03/15/2023    Lab Results   Component Value Date    WBC 4.6 09/01/2020    HGB 16.1 09/01/2020    HCT 48.6 09/01/2020    MCV 87 09/01/2020     09/01/2020    @RESUFAST(BMP,CBC,BNP,TSH,  INR)@        This note has been dictated using voice recognition software. Any grammatical, typographical, or context distortions are unintentional and inherent to the software.    Katty Heredia PA-C, RD  General Cardiology           Thank you for allowing me to participate in the care of your patient.      Sincerely,     Katty Herdeia PA-C     [place]  [facility]  cc:   Dio Harden,   [address] [place] [address]

## 2024-11-19 NOTE — PROGRESS NOTES
HEART CARE FOLLOW UP    Primary Care: Tobi Mendes MD  Primary Cardiologist: Dr Harden      Assessment/Recommendations     Dyslipidemia  probable family hypercholesterolemia  CT coronary calcium score 9   Int he past,  and total cholesterol 292.  He has a strong family history of premature coronary artery disease with his father having an event at the age of less than 50.  He has had statin induced myositis with a CK greater than 4000 on Crestor in February 2023 and is also experienced myositis with atorvastatin and simvastatin.  He has subsequently been started on Zetia 10 Primitivo daily and Repatha.  Most recent lipid panel in January of this year showed an LDL of 56.  Continue Zetia 10 mg daily  Continue Repatha 140 mg every 14 days  Avoid statin therapy  Lifestyle management-discussed importance of regular exercise and heart healthy diet      Return to care in  2 years  with Dr. Harden.      History of Present Illness/Subjective    Eliud Groves is a 49 year old male with past medical history significant for:  Hypercholesterolemia, severe  History of statin induced myositis  Hyperlipidemia  Family history of premature coronary artery disease    The patient was last seen in in clinic in June 2023 by my colleague Dr. Harden.  At that time the patient was doing well, he was active without any exertional limitations or cardiac symptoms.  He was noted to have tried multiple statins due to elevation of his LDL and lived a very healthy lifestyle, exercising regularly and monitoring his dietary intake.    Today the patient tells me he's been feeling well. He is tolerating Repatha without side effects. He works out a few times per week, does an [employer] that provides Home workouts. He an work out intensely for 20-30 minutes without limitations and is active outside. Patient denies chest pain, pressure and tightness. No shortness of breath or dyspnea on exertion. No dizziness, lightheadedness, pre-syncope or  syncope. No palpitations or racing heart. Sleeping very well without orthopnea or PND. No leg swelling or cramping. No claudication symptoms. No blood in stool or urine. No fevers, chills or cough.          Data Review Today:     CT coronary calcium screening June 2023:  Calcium Scoring The total Agatston score is 9.  A calcium score in this range places the individual in the 25th percentile when compared to an age and gender matched control group and implies a low risk of cardiac events in the next ten years (less than 1% per year).         I have reviewed and updated the patient's past medical history, allergy list and medication list.          Physical Examination   Vitals: /86 (BP Location: Right arm, Patient Position: Sitting, Cuff Size: Adult Regular)   Pulse 76   Resp 14   Wt 89.4 kg (197 lb)   BMI 27.80 kg/m      BMI= Body mass index is 27.8 kg/m .    Wt Readings from Last 3 Encounters:   11/19/24 89.4 kg (197 lb)   06/07/23 90.7 kg (200 lb)   10/13/22 52.2 kg (115 lb)       General :   Alert and oriented, in no acute distress.    HEENT:  Normocephalic and atraumatic. .    Neck: No JVP, carotid bruit or obvious thyromegaly.   Lungs:   Respirations unlabored. Clear bilaterally with no rales, rhonchi, or wheezes.     Cardiovascular:   Rhythm is regular. S1 and S2 are normal. o significant murmur is present. Lower extremities demonstrate no significant edema.        Skin: Skin is warm, dry, and otherwise intact.   Neurologic: Gait not assessed. Mood and affect appropriate.           Medical History  Surgical History Family History Social History   No past medical history on file. No past surgical history on file. No family history on file. Social History     Socioeconomic History    Marital status:      Spouse name: Not on file    Number of children: Not on file    Years of education: Not on file    Highest education level: Not on file   Occupational History    Not on file   Tobacco Use     Smoking status: Never    Smokeless tobacco: Never   Substance and Sexual Activity    Alcohol use: Not on file    Drug use: Not on file    Sexual activity: Not on file   Other Topics Concern    Not on file   Social History Narrative    Not on file     Social Drivers of Health     Financial Resource Strain: Not on file   Food Insecurity: Not on file   Transportation Needs: Not on file   Physical Activity: Not on file   Stress: Not on file   Social Connections: Not on file   Interpersonal Safety: Not on file   Housing Stability: Not on file          Medications  Allergies   Scheduled Meds:  Current Outpatient Medications   Medication Sig Dispense Refill    evolocumab (REPATHA SURECLICK) 140 MG/ML prefilled autoinjector Inject 1 mL (140 mg) subcutaneously every 14 days. 6 mL 3    Allergies   Allergen Reactions    Atorvastatin Muscle Pain (Myalgia)    Rosuvastatin Muscle Pain (Myalgia)     Severe myositis    Simvastatin Unknown     Elevated CPK         Lab Results    Chemistry/lipid CBC Cardiac Enzymes/BNP/TSH/INR   Lab Results   Component Value Date    CHOL 137 01/05/2024    HDL 44 01/05/2024    TRIG 183 (H) 01/05/2024    BUN 18.0 03/15/2023     03/15/2023    CO2 27 03/15/2023    Lab Results   Component Value Date    WBC 4.6 09/01/2020    HGB 16.1 09/01/2020    HCT 48.6 09/01/2020    MCV 87 09/01/2020     09/01/2020    @RESUFAST(BMP,CBC,BNP,TSH,  INR)@        This note has been dictated using voice recognition software. Any grammatical, typographical, or context distortions are unintentional and inherent to the software.    Katty Heredia PA-C, RD  General Cardiology

## 2024-11-19 NOTE — PATIENT INSTRUCTIONS
It was great to see you today! Your care team includes myself and Dr. Harden.    Recommendations:  Continue Repatha as you are  Aim for 30 minutes of exercise most days of the week  Try to add more fruit and vegetables to your daily intake     Follow up in 2 years with Dr Harden.     If you have questions, concerns, or new concerning symptoms prior to your next appointment, please contact the Cardiology Nursing team at [phone].    For scheduling issues/changes, please call [phone].

## 2024-11-19 NOTE — Clinical Note
This patient is a previously is doing great, LDL 56 on Repatha.  He follows closely with his PCP I will have him see us in 2 years.

## 2024-12-07 ENCOUNTER — HEALTH MAINTENANCE LETTER (OUTPATIENT)
Age: 49
End: 2024-12-07